# Patient Record
Sex: FEMALE | Race: WHITE
[De-identification: names, ages, dates, MRNs, and addresses within clinical notes are randomized per-mention and may not be internally consistent; named-entity substitution may affect disease eponyms.]

---

## 2018-02-08 ENCOUNTER — HOSPITAL ENCOUNTER (INPATIENT)
Dept: HOSPITAL 45 - C.EDB | Age: 83
LOS: 9 days | Discharge: HOME HEALTH | DRG: 166 | End: 2018-02-17
Attending: FAMILY MEDICINE | Admitting: HOSPITALIST
Payer: COMMERCIAL

## 2018-02-08 VITALS
BODY MASS INDEX: 17.09 KG/M2 | HEIGHT: 64 IN | WEIGHT: 100.09 LBS | WEIGHT: 100.09 LBS | HEIGHT: 64 IN | BODY MASS INDEX: 17.09 KG/M2 | BODY MASS INDEX: 17.09 KG/M2

## 2018-02-08 VITALS
DIASTOLIC BLOOD PRESSURE: 70 MMHG | OXYGEN SATURATION: 91 % | HEART RATE: 89 BPM | TEMPERATURE: 98.24 F | SYSTOLIC BLOOD PRESSURE: 138 MMHG

## 2018-02-08 VITALS
OXYGEN SATURATION: 91 % | TEMPERATURE: 98.24 F | DIASTOLIC BLOOD PRESSURE: 90 MMHG | SYSTOLIC BLOOD PRESSURE: 137 MMHG | HEART RATE: 81 BPM

## 2018-02-08 VITALS — HEART RATE: 78 BPM | OXYGEN SATURATION: 99 %

## 2018-02-08 VITALS — OXYGEN SATURATION: 93 %

## 2018-02-08 VITALS
SYSTOLIC BLOOD PRESSURE: 159 MMHG | TEMPERATURE: 98.6 F | DIASTOLIC BLOOD PRESSURE: 91 MMHG | HEART RATE: 85 BPM | OXYGEN SATURATION: 96 %

## 2018-02-08 VITALS — OXYGEN SATURATION: 94 %

## 2018-02-08 VITALS — OXYGEN SATURATION: 92 % | HEART RATE: 84 BPM

## 2018-02-08 VITALS — OXYGEN SATURATION: 99 % | HEART RATE: 76 BPM

## 2018-02-08 DIAGNOSIS — J43.9: ICD-10-CM

## 2018-02-08 DIAGNOSIS — R91.1: ICD-10-CM

## 2018-02-08 DIAGNOSIS — I10: ICD-10-CM

## 2018-02-08 DIAGNOSIS — Z88.8: ICD-10-CM

## 2018-02-08 DIAGNOSIS — Z88.0: ICD-10-CM

## 2018-02-08 DIAGNOSIS — J96.21: ICD-10-CM

## 2018-02-08 DIAGNOSIS — F17.210: ICD-10-CM

## 2018-02-08 DIAGNOSIS — E46: ICD-10-CM

## 2018-02-08 DIAGNOSIS — R19.7: ICD-10-CM

## 2018-02-08 DIAGNOSIS — D50.9: ICD-10-CM

## 2018-02-08 DIAGNOSIS — Z85.038: ICD-10-CM

## 2018-02-08 DIAGNOSIS — H35.30: ICD-10-CM

## 2018-02-08 DIAGNOSIS — J44.1: Primary | ICD-10-CM

## 2018-02-08 DIAGNOSIS — E03.9: ICD-10-CM

## 2018-02-08 DIAGNOSIS — J93.12: ICD-10-CM

## 2018-02-08 DIAGNOSIS — Z86.19: ICD-10-CM

## 2018-02-08 DIAGNOSIS — E87.6: ICD-10-CM

## 2018-02-08 DIAGNOSIS — Z82.49: ICD-10-CM

## 2018-02-08 DIAGNOSIS — E53.8: ICD-10-CM

## 2018-02-08 DIAGNOSIS — R64: ICD-10-CM

## 2018-02-08 DIAGNOSIS — Z79.899: ICD-10-CM

## 2018-02-08 LAB
ALBUMIN SERPL-MCNC: 3.9 GM/DL (ref 3.4–5)
ALP SERPL-CCNC: 88 U/L (ref 45–117)
ALT SERPL-CCNC: 21 U/L (ref 12–78)
AST SERPL-CCNC: 26 U/L (ref 15–37)
BASOPHILS # BLD: 0.04 K/UL (ref 0–0.2)
BASOPHILS NFR BLD: 0.8 %
BUN SERPL-MCNC: 14 MG/DL (ref 7–18)
CALCIUM SERPL-MCNC: 9.4 MG/DL (ref 8.5–10.1)
CO2 SERPL-SCNC: 30 MMOL/L (ref 21–32)
CREAT SERPL-MCNC: 0.68 MG/DL (ref 0.6–1.2)
EOS ABS #: 0.13 K/UL (ref 0–0.5)
EOSINOPHIL NFR BLD AUTO: 156 K/UL (ref 130–400)
GLUCOSE SERPL-MCNC: 97 MG/DL (ref 70–99)
HCT VFR BLD CALC: 44.6 % (ref 37–47)
HGB BLD-MCNC: 15.2 G/DL (ref 12–16)
IG#: 0.01 K/UL (ref 0–0.02)
IMM GRANULOCYTES NFR BLD AUTO: 26 %
INR PPP: 1 (ref 0.9–1.1)
LYMPHOCYTES # BLD: 1.38 K/UL (ref 1.2–3.4)
MCH RBC QN AUTO: 31.7 PG (ref 25–34)
MCHC RBC AUTO-ENTMCNC: 34.1 G/DL (ref 32–36)
MCV RBC AUTO: 92.9 FL (ref 80–100)
MONO ABS #: 0.56 K/UL (ref 0.11–0.59)
MONOCYTES NFR BLD: 10.6 %
NEUT ABS #: 3.18 K/UL (ref 1.4–6.5)
NEUTROPHILS # BLD AUTO: 2.5 %
NEUTROPHILS NFR BLD AUTO: 59.9 %
PMV BLD AUTO: 10.2 FL (ref 7.4–10.4)
POTASSIUM SERPL-SCNC: 3.2 MMOL/L (ref 3.5–5.1)
PROT SERPL-MCNC: 7.3 GM/DL (ref 6.4–8.2)
PTT PATIENT: 27.3 SECONDS (ref 21–31)
RED CELL DISTRIBUTION WIDTH CV: 13.2 % (ref 11.5–14.5)
RED CELL DISTRIBUTION WIDTH SD: 45.1 FL (ref 36.4–46.3)
SODIUM SERPL-SCNC: 134 MMOL/L (ref 136–145)
WBC # BLD AUTO: 5.3 K/UL (ref 4.8–10.8)

## 2018-02-08 RX ADMIN — METOPROLOL TARTRATE SCH MG: 50 TABLET, FILM COATED ORAL at 20:11

## 2018-02-08 RX ADMIN — IPRATROPIUM BROMIDE AND ALBUTEROL SULFATE SCH ML: .5; 3 SOLUTION RESPIRATORY (INHALATION) at 19:20

## 2018-02-08 RX ADMIN — ALUMINUM ZIRCONIUM TRICHLOROHYDREX GLY SCH EA: 0.2 STICK TOPICAL at 22:11

## 2018-02-08 RX ADMIN — IPRATROPIUM BROMIDE AND ALBUTEROL SULFATE SCH ML: .5; 3 SOLUTION RESPIRATORY (INHALATION) at 17:35

## 2018-02-08 RX ADMIN — CHOLESTYRAMINE SCH GM: 4 POWDER, FOR SUSPENSION ORAL at 21:51

## 2018-02-08 RX ADMIN — METHYLPREDNISOLONE SODIUM SUCCINATE SCH MLS/MIN: 1 INJECTION, POWDER, FOR SOLUTION INTRAMUSCULAR; INTRAVENOUS at 21:51

## 2018-02-08 NOTE — DIAGNOSTIC IMAGING REPORT
CHEST ONE VIEW PORTABLE



CLINICAL HISTORY: Respiratory distress    



COMPARISON STUDY:  1/25/2010



FINDINGS: The chest has an emphysematous configuration. There is a left-sided

pneumothorax with maximal pleural separation of 20 mm. There is no overt

failure. Left basilar airspace opacities are likely atelectatic. There is mild

blunting of the right lateral costophrenic angle.[ 



IMPRESSION: Left-sided pneumothorax with pleural separation of 20 mm.







Electronically signed by:  Sidney Guevara M.D.

2/8/2018 1:32 PM



Dictated Date/Time:  2/8/2018 1:30 PM

## 2018-02-08 NOTE — HISTORY AND PHYSICAL
History & Physical


Date & Time of Service:


Feb 8, 2018 at 16:31


Chief Complaint:


Chest Pain


Primary Care Physician:


Alison Cruz D.O.


History of Present Illness


Source:  patient, family (at bedside), clinic records, hospital records


This is an 82yo F with a PMH of moderate COPD, HTN, tobacco use disorder, 

hypothyroidism and other medical problems listed below who presents with SOB 

beginning this morning. Patient states that 2 mornings ago, she woke up with 

severe pain behind her L shoulder blade that resolved spontaneously throughout 

the day. This morning, patient was getting ready for the day and became 

extremely SOB stating that she couldn't catch her breath and felt like she was 

suffocating. Felt her heart beating very rapidly and felt lightheaded. No chest 

pain. Called EMS and was found to be hypoxic in the 70s en route to the ED. 

Patient is a smoker and has >60 pack years. Does not use home O2 or inhalers. 

Lives alone and ambulates without assistive devices. Has a history of MAXIMINO but 

follows with hematology for IV iron injections. Received her last series 6 

months ago and is due back to clinic for a follow-up. Denies fever, chills, 

headache, visual changes, chest pain, abdominal pain, nausea, vomiting, LE 

swelling. Has chronic diarrhea s/p R hemicolectomy for colon cancer. H/o C diff.





Past Medical/Surgical History


Medical Problems:


(1) COPD (chronic obstructive pulmonary disease)


Status: Chronic  





(2) History of Clostridium difficile infection


Status: Chronic  





(3) Hypertension


Status: Chronic  





(4) Hypothyroidism


Status: Chronic  





(5) MAXIMINO (iron deficiency anemia)


Status: Chronic  





(6) Macular degeneration (senile) of retina, unspecified


Status: Chronic  





(7) Osteoporosis


Status: Chronic  





(8) Tobacco use disorder


Status: Chronic  





(9) Venous insufficiency


Status: Chronic  








Family History





Cancer


Heart disease


Hypertension





Social History


Smoking Status:  Current Every Day Smoker


Housing status:  lives alone


Occupational Status:  retired





Immunizations


History of Influenza Vaccine:  Yes


Influenza Vaccine Date:  Oct 15, 2010


History of Tetanus Vaccine?:  No


Tetanus Immunization Date:  Jul 20, 1983


History of Pneumococcal:  Yes


Pneumococcal Date:  Fuad 15, 2007


History of Hepatitis B Vaccine:  No





Multi-Drug Resistant Organisms


History of MDRO:  No





Allergies


Coded Allergies:  


     Penicillins (Verified  Allergy, Intermediate, RASH; HAS TOLERATED MEFOXIN, 

2/8/18)


     ACE Inhibitors (Verified  Adverse Reaction, Unknown, SEVERE COUGH, 2/8/18)





Home Medications


Scheduled


Amlodipine (Norvasc), 5 MG PO DAILY


Ascorbic Acid (Ascorbic Acid), 500 MG PO DAILY


Cholecalciferol (D3 2000), 1 TAB PO DAILY


Cholestyramine Light (Prevalite), 4 GM PO BID


Cyanocobalamin (Vitamin B-12), 1,000 MCG PO DAILY


Folic Acid (Folic Acid), 1 TAB PO HS


Hydrochlorothiazide (Hydrochlorothiazide), 1 TAB PO DAILY


Levothyroxine Sodium (Levothyroxine Sodium), 1 TAB PO QAM


Metoprolol Tartrate (Lopressor) (Lopressor), 50 MG PO BID


Potassium Bicarbonate (Potassium Bicarbonate), 25 MEQ PO HS





Review of Systems


Ten systems reviewed and negative except as noted in the HPI.





Physical Exam


Vital Signs











  Date Time  Temp Pulse Resp B/P (MAP) Pulse Ox O2 Delivery O2 Flow Rate FiO2


 


2/8/18 16:01  83  151/91 95   


 


2/8/18 15:31  85   96   


 


2/8/18 15:30    150/94    


 


2/8/18 15:01  84  161/87 98   


 


2/8/18 14:31  83  125/93 90   


 


2/8/18 14:01  81   98   


 


2/8/18 14:00    151/91    


 


2/8/18 13:46     99 Non-Rebreather 15.0 


 


2/8/18 13:31  77  142/77 99   


 


2/8/18 13:30  82 20 148/93 99 BiPAP  60


 


2/8/18 13:29    148/93    


 


2/8/18 13:16    154/102    


 


2/8/18 13:09  78 36  99 BiPAP/CPAP  60


 


2/8/18 13:07  76   99   60


 


2/8/18 13:01  76 29  98   


 


2/8/18 12:59  78      


 


2/8/18 12:42    154/102    


 


2/8/18 12:32     98 Non-Rebreather  


 


2/8/18 12:31     74 Room Air  


 


2/8/18 12:31 36.5 81 23 154/102 98 Room Air  


 


2/8/18 12:31     74 Room Air  


 


2/8/18 12:31     74 Room Air  


 


2/8/18 12:31     74 Room Air  








General Appearance:  + mild distress, + pertinent finding (Cachectic, Breathing 

comfortably with non-rebreather. )


Head:  normocephalic, atraumatic


Eyes:  normal inspection, PERRL, sclerae normal


ENT:  normal ENT inspection, hearing grossly normal, pharynx normal


Neck:  supple, thyroid normal, trachea midline


Respiratory/Chest:  chest non-tender, lungs clear, no respiratory distress, no 

accessory muscle use, + decreased breath sounds (2/2 shallow breaths ), + 

pertinent finding (Coarse breath sounds )


Cardiovascular:  regular rate, rhythm, no murmur, normal peripheral pulses


Abdomen/GI:  non tender, soft, no organomegaly


Back:  normal inspection


Extremities/Musculoskelatal:  normal inspection, no calf tenderness, no pedal 

edema


Neurologic/Psych:  no motor/sensory deficits, alert, normal mood/affect, 

oriented x 3


Skin:  normal color, warm/dry





Diagnostics


Laboratory Results





Results Past 24 Hours








Test


  2/8/18


12:22 Range/Units


 


 


White Blood Count 5.30 4.8-10.8  K/uL


 


Red Blood Count 4.80 4.2-5.4  M/uL


 


Hemoglobin 15.2 12.0-16.0  g/dL


 


Hematocrit 44.6 37-47  %


 


Mean Corpuscular Volume 92.9   fL


 


Mean Corpuscular Hemoglobin 31.7 25-34  pg


 


Mean Corpuscular Hemoglobin


Concent 34.1


  32-36  g/dl


 


 


Platelet Count 156 130-400  K/uL


 


Mean Platelet Volume 10.2 7.4-10.4  fL


 


Neutrophils (%) (Auto) 59.9  %


 


Lymphocytes (%) (Auto) 26.0  %


 


Monocytes (%) (Auto) 10.6  %


 


Eosinophils (%) (Auto) 2.5  %


 


Basophils (%) (Auto) 0.8  %


 


Neutrophils # (Auto) 3.18 1.4-6.5  K/uL


 


Lymphocytes # (Auto) 1.38 1.2-3.4  K/uL


 


Monocytes # (Auto) 0.56 0.11-0.59  K/uL


 


Eosinophils # (Auto) 0.13 0-0.5  K/uL


 


Basophils # (Auto) 0.04 0-0.2  K/uL


 


RDW Standard Deviation 45.1 36.4-46.3  fL


 


RDW Coefficient of Variation 13.2 11.5-14.5  %


 


Immature Granulocyte % (Auto) 0.2  %


 


Immature Granulocyte # (Auto) 0.01 0.00-0.02  K/uL


 


Prothrombin Time


  10.0


  9.0-12.0


SECONDS


 


Prothromb Time International


Ratio 1.0


  0.9-1.1  


 


 


Activated Partial


Thromboplast Time 27.3


  21.0-31.0


SECONDS


 


Partial Thromboplastin Ratio 1.1  


 


Sodium Level 134 136-145  mmol/L


 


Potassium Level 3.2 3.5-5.1  mmol/L


 


Chloride Level 97   mmol/L


 


Carbon Dioxide Level 30 21-32  mmol/L


 


Anion Gap 7.0 3-11  mmol/L


 


Blood Urea Nitrogen 14 7-18  mg/dl


 


Creatinine


  0.68


  0.60-1.20


mg/dl


 


Est Creatinine Clear Calc


Drug Dose 47.5


   ml/min


 


 


Estimated GFR (


American) 93.8


   


 


 


Estimated GFR (Non-


American 80.9


   


 


 


BUN/Creatinine Ratio 21.2 10-20  


 


Random Glucose 97 70-99  mg/dl


 


Calcium Level 9.4 8.5-10.1  mg/dl


 


Total Bilirubin 1.3 0.2-1  mg/dl


 


Aspartate Amino Transf


(AST/SGOT) 26


  15-37  U/L


 


 


Alanine Aminotransferase


(ALT/SGPT) 21


  12-78  U/L


 


 


Alkaline Phosphatase 88   U/L


 


Troponin I < 0.015 0-0.045  ng/ml


 


Total Protein 7.3 6.4-8.2  gm/dl


 


Albumin 3.9 3.4-5.0  gm/dl


 


Globulin 3.4 2.5-4.0  gm/dl


 


Albumin/Globulin Ratio 1.2 0.9-2  











Diagnostic Radiology


CXR: IMPRESSION: Left-sided pneumothorax with pleural separation of 20 mm.





EKG


Normal sinus rhythm with sinus arrhythmia





Impression


Assessment and Plan


This is an 82yo F with a PMH of moderate COPD, HTN, tobacco use disorder, 

hypothyroidism and other medical problems listed below who presents with SOB 

beginning this morning and was found to have a small left sided pneumothorax. 





Acute on chronic hypoxic respiratory failure: 


-2/2 pneumothorax, copd exacerbation 


-Found to be hypoxic in 70s by EMS 


-O2 saturation improved on non-rebreather 


-Transitioned to 5L NC 


-Monitor





Left pneumothorax:


-L shoulder pain, SOB 


-CXR with Left-sided pneumothorax with pleural separation of 20 mm


-Consulted thoracic surgery, pulmonary  


-Serial CXRs 


-NC O2 to maintain oxygen saturation >90% 





COPD exacerbation: 


-History of moderate COPD. Does not take home inhalers, use O2 


-Over 60 pack years, still smoking 


-Given rocephin, zithromax, IV steroids in ED 


-Continue antibiotics 


-Solumedrol 


-Duonebs 





Hypothyroidism: 


-Cont levothyroxine 





HTN: 


-Cont home dose amlodipine, hctz, lopressor 





Hypokalemia: 


-Replaced


-Cont home dose oral supplements  





Tobacco use disorder:


-Smoking cessation ordered 





Diarrhea:


-H/o colon cancer s/p resection, c diff


-Cont home dose Prevalite 


-C diff pending 





Anemia (iron deficiency, b12 deficiency):


-Hgb within normal range at 15


-Has a history of MAXIMINO but follows with hematology for IV iron injections


-Cont Vit b12, folate supplementation 





DVT Ppx: SCDs


Code status: FULL per discussion with patient, family 


PCP: Nancy 


Dispo: Admitted telemetry. Discharge planning (patient lives alone and is >80). 

PT/OT evals ordered. 





Patient seen in collaboration with Dr. Randle. Please see addendum. 





ATTENDING ADDENDUM : 


pt seen and examined , care co -ordinated with Tori Limon PA-C 





82 yo F with hx of heavy smoking for long time ( 1 pk a day > 50 yrs ) presents 

with sudden onset of severe chest pain /SOB 


was hypoxic in ER Spo2 74 % in RA 


CXray shows : left Lung pneumothorax 2 cm pleural separation , tubular heart 

with flattened diaphragm , suggestive of severe underlying emphysematous lung 

disease 





P/E: 


GEN : frail appearing elderly female, very pleasant, no apparent distress noted 

,speaking in sentences 


HEENT : sclera non icteric , PERRLA/EOMI 


HT : S1/S2 , no lower ext edema 


LUNGS; diminished, no wheeze or rales noted 


ABDOMEN : soft, non tender 


EXT : no rash or deformity 


NEURO: no focal deficit , AAO x3, 





A/P





ACUTE ON CHRONIC RESPIRATORY FAILURE : 


hx of heaving smoking for years -possible underlying advanced emphysema /COPD ( 

no formal diagnosis ) 


presents with SOB /Hypoxia , found to have spontaneous pneumothorax in left 

upper lungs -possible rupture of bullae 


hypoxia improved after 02 supplement , given IV steroid and 1 hr long neb x in 

ER 


pt is admitted to PCU 


cont respiratory support -on 4 l 02 via nasal canula ( was not on home 02 ) 


cont IV solu medrol , Neb tx 


CT surgery consulted for pneumothorax -appreciate input , serial Cxray ordered-

shows stable 21 mm Left sided pneumothorax 


cont to observe as pt remains clinically stable 


in any event of respiratory distress, progressive hypoxia ,  increase in size 

of the pneumothorax -will need chest tube placement 





Pulmonology eval requested, pt will need formal PFT testing as out pt 


possible 2 step exercise to assess for home02 





TOBACCO ABUSE DISORDER: 


smokes a pk /cig a day for last 50-60yrs 


smoking cessation counselling provided 


pt is updated -her spont pneumothorax /SOB are consequences of long term 

heaving smoking 


willing to quit 





LOW BMI/PROTEIN CALORIE MALNUTRITION : 


possible due to advanced pulm disease 


liberalized diet to regular 


dietary consulted for supplements








LOW NA/LOW K


possible due to HCTZ /poor PO intake 


NSS IV fluid ordered 


K replaced, 


repeat BMP in AM 





FULL CODE 





please refer to documentation of Tori Limon PA-C for further discussion of 

other issues 





YANIV RANDLE MD 





VITALS :








Last 8 Hrs








  Date Time  Temp Pulse Resp B/P (MAP) Pulse Ox O2 Delivery O2 Flow Rate FiO2


 


2/8/18 20:04 36.8 89 20 138/70 (92) 91 Nasal Cannula 4.0 


 


2/8/18 19:23  84 22  92 Nasal Cannula 4.0 


 


2/8/18 18:19     94 Nasal Cannula 4.0 





      Humidified Oxygen  


 


2/8/18 17:21     93 Nasal Cannula 5.0 


 


2/8/18 16:50 37.0 85 24 159/91 96 Non-Rebreather 15.0 


 


2/8/18 16:01  83  151/91 95   


 


2/8/18 15:31  85   96   


 


2/8/18 15:30    150/94    


 


2/8/18 15:01  84  161/87 98   


 


2/8/18 14:31  83  125/93 90   


 


2/8/18 14:01  81   98   


 


2/8/18 14:00    151/91    











Level of Care


Telemetry





Resuscitation Status


FULL RESUSCITATION





VTE Prophylaxis


VTE Risk Assessment Done? Y/N:  Yes


Risk Level:  Moderate


Given or contraindicated:  SCD's





Additional Copies To


Alison Cruz D.O.

## 2018-02-08 NOTE — DIAGNOSTIC IMAGING REPORT
CHEST ONE VIEW PORTABLE



CLINICAL HISTORY: 83 years-old Female presenting with F/u PTX. 



TECHNIQUE: Portable upright AP view of the chest was obtained.



COMPARISON: 2/8/2018 at 3:43 PM.



FINDINGS:

Atherosclerosis of aortic arch. Cardiac silhouette mildly enlarged.

Hyperinflation of the lungs with relative radiolucency of the upper lobes.

Significant interval increase in left lower lobe collapse, which now appears

complete. Persistent small moderate left pleural effusion with a pleural

separation of 21 mm, unchanged. Osteopenia. Upper abdomen normal.



IMPRESSION:

1.  Interval development of complete left lower lobe collapse.



2.  Unchanged small to moderate left pneumothorax.



3.  Emphysema.



The report will be called/faxed according to standard departmental protocol.







Electronically signed by:  Sai Fraga M.D.

2/8/2018 9:09 PM



Dictated Date/Time:  2/8/2018 9:07 PM

## 2018-02-08 NOTE — EMERGENCY ROOM VISIT NOTE
History


Report prepared by Jeny:  Armida Cameron


Under the Supervision of:  Dr. Manohar Linn M.D.


First contact with patient:  12:35


Chief Complaint:  CHEST PAIN


Stated Complaint:  CHEST PAIN





History of Present Illness


The patient is an 83 year old white female with a past medical history of 

hypertension who presents to the ED with a cc of persistent SOB beginning 1 

hour ago. She presents to the ED by EMS. She received aspirin and nitro in 

route. Positive nausea, cough, resolved palpitations, resolved chest pain. 

Negative vomiting, leg swelling, weight gain. She does smoke. She is not on any 

blood thinners. She received a flu shot this season.





   Source of History:  patient, nursing staff


   Onset:  1 hour ago


   Position:  other (global)


   Quality:  other (SOB)


   Timing:  other (persistent)


   Associated Symptoms:  + cough, + chest pain (resolved), + nausea, No vomiting





Review of Systems


See HPI for pertinent positives and negatives.  A total of ten systems were 

reviewed and were otherwise negative.





Past Medical & Surgical


Medical Problems:


(1) COPD (chronic obstructive pulmonary disease)


(2) History of Clostridium difficile infection


(3) Hypertension


(4) Hypothyroidism


(5) MAXIMINO (iron deficiency anemia)


(6) Macular degeneration (senile) of retina, unspecified


(7) Osteoporosis


(8) Tobacco use disorder


(9) Venous insufficiency


Surgical Problems:


(1) H/O partial resection of colon








Family History





Cancer


Heart disease


Hypertension





Social History


Smoking Status:  Current Every Day Smoker


Occupation Status:  retired





Current/Historical Medications


Scheduled


Amlodipine (Norvasc), 5 MG PO DAILY


Ascorbic Acid (Ascorbic Acid), 500 MG PO DAILY


Cholecalciferol (D3 2000), 1 TAB PO DAILY


Cholestyramine Light (Prevalite), 4 GM PO BID


Cyanocobalamin (Vitamin B-12), 1,000 MCG PO DAILY


Folic Acid (Folic Acid), 1 TAB PO HS


Hydrochlorothiazide (Hydrochlorothiazide), 1 TAB PO DAILY


Levothyroxine Sodium (Levothyroxine Sodium), 1 TAB PO QAM


Metoprolol Tartrate (Lopressor) (Lopressor), 50 MG PO BID


Potassium Bicarbonate (Potassium Bicarbonate), 25 MEQ PO HS





Allergies


Coded Allergies:  


     Penicillins (Verified  Allergy, Intermediate, RASH; HAS TOLERATED MEFOXIN, 

2/8/18)


     ACE Inhibitors (Verified  Adverse Reaction, Unknown, SEVERE COUGH, 2/8/18)





Physical Exam


Vital Signs











  Date Time  Temp Pulse Resp B/P (MAP) Pulse Ox O2 Delivery O2 Flow Rate FiO2


 


2/8/18 15:31  85   96   


 


2/8/18 15:30    150/94    


 


2/8/18 15:01  84  161/87 98   


 


2/8/18 14:31  83  125/93 90   


 


2/8/18 14:01  81   98   


 


2/8/18 14:00    151/91    


 


2/8/18 13:46     99 Non-Rebreather 15.0 


 


2/8/18 13:31  77  142/77 99   


 


2/8/18 13:30  82 20 148/93 99 BiPAP  60


 


2/8/18 13:29    148/93    


 


2/8/18 13:16    154/102    


 


2/8/18 13:09  78 36  99 BiPAP/CPAP  60


 


2/8/18 13:07  76   99   60


 


2/8/18 13:01  76 29  98   


 


2/8/18 12:59  78      


 


2/8/18 12:42    154/102    


 


2/8/18 12:32     98 Non-Rebreather  


 


2/8/18 12:31     74 Room Air  


 


2/8/18 12:31 36.5 81 23 154/102 98 Room Air  


 


2/8/18 12:31     74 Room Air  


 


2/8/18 12:31     74 Room Air  


 


2/8/18 12:31     74 Room Air  











Physical Exam


GENERAL: Awake, alert, emaciated, NAD


HENT: Normocephalic, atraumatic.


EYES: Normal conjunctiva. Sclera non-icteric.


NECK: Supple. No nuchal rigidity. FROM.


RESPIRATORY: Tachypnea, shallow breaths, bibasilar crackles. 


CARDIAC: RRR, no MRG


ABDOMEN: Soft, NTND, BS+


MSK: No chest wall TTP, no LE edema


NEURO: GCS 15, CN 2-12 intact, moves all 4s on command


SKIN: No rash or jaundice noted.





Medical Decision & Procedures


ER Provider


Diagnostic Interpretation:


Xray results as stated below per my and radiologist interpretation: 





CHEST ONE VIEW PORTABLE





CLINICAL HISTORY: Respiratory distress    





COMPARISON STUDY:  1/25/2010





FINDINGS: The chest has an emphysematous configuration. There is a left-sided


pneumothorax with maximal pleural separation of 20 mm. There is no overt


failure. Left basilar airspace opacities are likely atelectatic. There is mild


blunting of the right lateral costophrenic angle.[ 





IMPRESSION: Left-sided pneumothorax with pleural separation of 20 mm.





Electronically signed by:  Sidney Guevara M.D.


2/8/2018 1:32 PM





Dictated Date/Time:  2/8/2018 1:30 PM





Laboratory Results











Test


  2/8/18


12:22


 


Immature Granulocyte % (Auto) 0.2 % 


 


White Blood Count


  5.30 K/uL


(4.8-10.8)


 


Red Blood Count


  4.80 M/uL


(4.2-5.4)


 


Hemoglobin


  15.2 g/dL


(12.0-16.0)


 


Hematocrit 44.6 % (37-47) 


 


Mean Corpuscular Volume


  92.9 fL


()


 


Mean Corpuscular Hemoglobin


  31.7 pg


(25-34)


 


Mean Corpuscular Hemoglobin


Concent 34.1 g/dl


(32-36)


 


Platelet Count


  156 K/uL


(130-400)


 


Mean Platelet Volume


  10.2 fL


(7.4-10.4)


 


Neutrophils (%) (Auto) 59.9 % 


 


Lymphocytes (%) (Auto) 26.0 % 


 


Monocytes (%) (Auto) 10.6 % 


 


Eosinophils (%) (Auto) 2.5 % 


 


Basophils (%) (Auto) 0.8 % 


 


Neutrophils # (Auto)


  3.18 K/uL


(1.4-6.5)


 


Lymphocytes # (Auto)


  1.38 K/uL


(1.2-3.4)


 


Monocytes # (Auto)


  0.56 K/uL


(0.11-0.59)


 


Eosinophils # (Auto)


  0.13 K/uL


(0-0.5)


 


Basophils # (Auto)


  0.04 K/uL


(0-0.2)


 


Immature Granulocyte # (Auto)


  0.01 K/uL


(0.00-0.02)


 


Prothrombin Time


  10.0 SECONDS


(9.0-12.0)


 


Prothromb Time International


Ratio 1.0 (0.9-1.1) 


 


 


Activated Partial


Thromboplast Time 27.3 SECONDS


(21.0-31.0)


 


Partial Thromboplastin Ratio 1.1 


 


Total Bilirubin


  1.3 mg/dl


(0.2-1)


 


Aspartate Amino Transf


(AST/SGOT) 26 U/L (15-37) 


 


 


Alanine Aminotransferase


(ALT/SGPT) 21 U/L (12-78) 


 


 


Alkaline Phosphatase


  88 U/L


()


 


Total Protein


  7.3 gm/dl


(6.4-8.2)


 


Albumin


  3.9 gm/dl


(3.4-5.0)


 


Globulin


  3.4 gm/dl


(2.5-4.0)


 


Albumin/Globulin Ratio 1.2 (0.9-2) 





Laboratory results reviewed by me





Medications Administered











 Medications


  (Trade)  Dose


 Ordered  Sig/Adali


 Route  Start Time


 Stop Time Status Last Admin


Dose Admin


 


 Albuterol/


 Ipratropium


  (Duoneb)  12 ml  ONE  ONCE


 INH  2/8/18 13:00


 2/8/18 13:01 DC 2/8/18 13:07


12 ML


 


 Methylprednisolone


 Sodium Succinate


  (Solu-Medrol IV)  125 mg  NOW  STAT


 IV  2/8/18 12:50


 2/8/18 12:53 DC 2/8/18 14:25


125 MG


 


 Ceftriaxone Sodium


  (Rocephin Inj)  1 gm  NOW  STAT


 IV  2/8/18 12:50


 2/8/18 12:53 DC 2/8/18 14:25


1 GM


 


 Azithromycin


  (Zithromax Tab)  500 mg  NOW  ONCE


 PO  2/8/18 13:00


 2/8/18 13:01 DC 2/8/18 14:25


500 MG











ECG


Indication:  SOB/dyspnea


Rate (beats per minute):  84


Rhythm:  sinus with SA


Findings:  other (right axis deviation, normal intervals)


Change:


Patient's electrocardiogram interpreted by me.





ED Course


1243: The patient was evaluated in room B9. A complete history and physical 

exam was performed.





1344: Dr. Thakur was paged. 





1444: I discussed the patient's case with Tori Limon PA-C [facility] 

hospitalist. The patient will be evaluated for further treatment and 

disposition.





Medical Decision


The patient is an 83 year old white female with a past medical history of 

hypertension who presents to the ED with a cc of persistent SOB beginning 1 

hour ago.





Differential diagnosis:


Etiologies such as infections, reactive airway disease, pneumonia, pneumothorax

, COPD, CHF, cardiac ischemia, pulmonary embolism, musculoskeletal, 

gastrointestinal, as well as others were entertained.





Patient seen and evaluated at bedside. Patient w/ hypoxia in triage to high 

70s. Placed on NRB mask. Patient mild tachypnea. Patient has mild chronic SOB 

but worse in last hour. Patient was given ASA and nitro en route. Denies 

improvement in symptoms. EKG no acute ischemia. BiPAP ordered and CXR, labs. 

Patient has > 60 pack year smoking hx so trx'ed for possible COPD exacerbation 

and covered empirically w/ abx given hypoxia and SOB. Patient CXR concerning 

for L sided PNX. Not very large. No shift. Likely 2/2 to COPD/emphysema bleb 

rupture given smoking hx. Patient immediately removed off BiPAP and placed back 

on NRB mask. Patient other blood work fairly unremarkable. Patient discussed w/ 

hospitalist to monitor for PNX. Discussed patient w/ thoracic surgery. Do not 

believe patient requires chest tube placement at this time. No midline shift on 

CXR, not tachy, tolerating NRB mask and not hypoxic, not hypotensive. Patient 

admitted to medicine. 








Medication Reconcilliation


Current Medication List:  was personally reviewed by me





Blood Pressure Screening


Patient's blood pressure:  Elevated blood pressure


Referred to hospitalist.





Consults


Time Called:  1440


Consulting Physician:  SE Alberto hospitalist


Returned Call:  1444


Discussed the patient's case. The patient will be evaluated for further 

treatment and disposition.





Impression





 Primary Impression:  


 Pneumothorax


 Additional Impressions:  


 SOB (shortness of breath)


 Respiratory failure with hypoxia


 COPD (chronic obstructive pulmonary disease)


 Hypokalemia





Critical Care


I have personally spent greater than 55 minutes of critical care time in the 

direct management of this patient.  This includes bedside care, interpretation 

of diagnostic studies, and testing, discussion with consultants, patient, and 

family members, and other required patient management activities.  This 55 

minutes is in excess of all separately billable procedures.





Scribe Attestation


The scribe's documentation has been prepared under my direction and personally 

reviewed by me in its entirety. I confirm that the note above accurately 

reflects all work, treatment, procedures, and medical decision making performed 

by me.





Departure Information


Dispostion


Being Evaluated By Hospitalist





Referrals


Jeannie Escoto M.D. (PCP)





Patient Instructions


My [facility]





Problem Qualifiers








 Primary Impression:  


 Pneumothorax


 Pneumothorax type:  spontaneous, primary  Qualified Codes:  J93.11 - Primary 

spontaneous pneumothorax


 Additional Impressions:  


 Respiratory failure with hypoxia


 Chronicity:  acute  Qualified Codes:  J96.01 - Acute respiratory failure with 

hypoxia


 COPD (chronic obstructive pulmonary disease)


 COPD type:  COPD with acute exacerbation  Qualified Codes:  J44.1 - Chronic 

obstructive pulmonary disease with (acute) exacerbation

## 2018-02-08 NOTE — DIAGNOSTIC IMAGING REPORT
CHEST ONE VIEW PORTABLE



CLINICAL HISTORY: 83 years-old Female presenting with serial XRs pneumothorax . 



TECHNIQUE: Portable upright AP view of the chest was obtained.



COMPARISON: 2/8/2018.



FINDINGS:

Atherosclerosis of aortic arch. Cardiac silhouette mildly enlarged, unchanged.

Lungs hyperinflated. Coarsened lung markings with relative radiolucency at the

apices. Persistent small to moderate left pneumothorax with a pleural separation

of 22 mm, not significant changed from prior (previously 20 mm). No pleural

effusion. No new focal infiltrate. No mediastinal shift. Osteopenia. Upper

abdomen normal.



IMPRESSION:

1.  Unchanged small to moderate left pneumothorax.



2.  Emphysema.







Electronically signed by:  Sai Fraga M.D.

2/8/2018 3:59 PM



Dictated Date/Time:  2/8/2018 3:57 PM

## 2018-02-08 NOTE — PROGRESS NOTE
Progress Note


Date of Service


Feb 8, 2018.





Progress Note


ATTENDING NOTE : 





cxray at 2100 reviewed :


Unchanged left upper lung Pneumothorax with pleural separation 21 mm 


pt remains hemodynamically stable 


denies of any symptom of chest pain or SOB 


Spo2 94 % on 4 L 02 via NC 





repeat Cxray ordered in AM

## 2018-02-09 VITALS — OXYGEN SATURATION: 92 %

## 2018-02-09 VITALS
OXYGEN SATURATION: 91 % | TEMPERATURE: 97.88 F | DIASTOLIC BLOOD PRESSURE: 84 MMHG | SYSTOLIC BLOOD PRESSURE: 129 MMHG | HEART RATE: 104 BPM

## 2018-02-09 VITALS
OXYGEN SATURATION: 92 % | DIASTOLIC BLOOD PRESSURE: 70 MMHG | HEART RATE: 90 BPM | TEMPERATURE: 98.6 F | SYSTOLIC BLOOD PRESSURE: 115 MMHG

## 2018-02-09 VITALS
SYSTOLIC BLOOD PRESSURE: 147 MMHG | DIASTOLIC BLOOD PRESSURE: 78 MMHG | HEART RATE: 78 BPM | TEMPERATURE: 98.06 F | OXYGEN SATURATION: 92 %

## 2018-02-09 VITALS
HEART RATE: 83 BPM | TEMPERATURE: 98.6 F | OXYGEN SATURATION: 92 % | SYSTOLIC BLOOD PRESSURE: 115 MMHG | DIASTOLIC BLOOD PRESSURE: 70 MMHG

## 2018-02-09 VITALS
HEART RATE: 74 BPM | TEMPERATURE: 98.24 F | DIASTOLIC BLOOD PRESSURE: 83 MMHG | SYSTOLIC BLOOD PRESSURE: 145 MMHG | OXYGEN SATURATION: 96 %

## 2018-02-09 VITALS
SYSTOLIC BLOOD PRESSURE: 116 MMHG | HEART RATE: 75 BPM | TEMPERATURE: 97.52 F | OXYGEN SATURATION: 96 % | DIASTOLIC BLOOD PRESSURE: 62 MMHG

## 2018-02-09 VITALS — SYSTOLIC BLOOD PRESSURE: 120 MMHG | DIASTOLIC BLOOD PRESSURE: 74 MMHG | OXYGEN SATURATION: 93 % | HEART RATE: 85 BPM

## 2018-02-09 VITALS
HEART RATE: 86 BPM | OXYGEN SATURATION: 96 % | DIASTOLIC BLOOD PRESSURE: 66 MMHG | SYSTOLIC BLOOD PRESSURE: 122 MMHG | TEMPERATURE: 98.06 F

## 2018-02-09 VITALS — OXYGEN SATURATION: 93 % | HEART RATE: 72 BPM

## 2018-02-09 VITALS — OXYGEN SATURATION: 92 % | HEART RATE: 90 BPM

## 2018-02-09 VITALS — HEART RATE: 76 BPM | OXYGEN SATURATION: 92 %

## 2018-02-09 VITALS — OXYGEN SATURATION: 95 %

## 2018-02-09 VITALS — OXYGEN SATURATION: 95 % | HEART RATE: 77 BPM

## 2018-02-09 VITALS — OXYGEN SATURATION: 99 %

## 2018-02-09 LAB
BUN SERPL-MCNC: 23 MG/DL (ref 7–18)
CALCIUM SERPL-MCNC: 8.4 MG/DL (ref 8.5–10.1)
CO2 SERPL-SCNC: 28 MMOL/L (ref 21–32)
CREAT SERPL-MCNC: 0.75 MG/DL (ref 0.6–1.2)
EOSINOPHIL NFR BLD AUTO: 139 K/UL (ref 130–400)
GLUCOSE SERPL-MCNC: 122 MG/DL (ref 70–99)
HCT VFR BLD CALC: 37 % (ref 37–47)
HGB BLD-MCNC: 12.4 G/DL (ref 12–16)
MCH RBC QN AUTO: 30.8 PG (ref 25–34)
MCHC RBC AUTO-ENTMCNC: 33.5 G/DL (ref 32–36)
MCV RBC AUTO: 91.8 FL (ref 80–100)
PMV BLD AUTO: 9.7 FL (ref 7.4–10.4)
POTASSIUM SERPL-SCNC: 4.2 MMOL/L (ref 3.5–5.1)
RED CELL DISTRIBUTION WIDTH CV: 13 % (ref 11.5–14.5)
RED CELL DISTRIBUTION WIDTH SD: 43.9 FL (ref 36.4–46.3)
SODIUM SERPL-SCNC: 135 MMOL/L (ref 136–145)
WBC # BLD AUTO: 4.69 K/UL (ref 4.8–10.8)

## 2018-02-09 RX ADMIN — IPRATROPIUM BROMIDE AND ALBUTEROL SULFATE SCH ML: .5; 3 SOLUTION RESPIRATORY (INHALATION) at 07:00

## 2018-02-09 RX ADMIN — METOPROLOL TARTRATE SCH MG: 50 TABLET, FILM COATED ORAL at 20:46

## 2018-02-09 RX ADMIN — METHYLPREDNISOLONE SODIUM SUCCINATE SCH MLS/MIN: 1 INJECTION, POWDER, FOR SOLUTION INTRAMUSCULAR; INTRAVENOUS at 21:46

## 2018-02-09 RX ADMIN — Medication SCH MCG: at 07:58

## 2018-02-09 RX ADMIN — IPRATROPIUM BROMIDE AND ALBUTEROL SULFATE SCH ML: .5; 3 SOLUTION RESPIRATORY (INHALATION) at 14:37

## 2018-02-09 RX ADMIN — HYDROCHLOROTHIAZIDE SCH MG: 25 TABLET ORAL at 07:57

## 2018-02-09 RX ADMIN — METHYLPREDNISOLONE SODIUM SUCCINATE SCH MLS/MIN: 1 INJECTION, POWDER, FOR SOLUTION INTRAMUSCULAR; INTRAVENOUS at 05:30

## 2018-02-09 RX ADMIN — ALUMINUM ZIRCONIUM TRICHLOROHYDREX GLY SCH EA: 0.2 STICK TOPICAL at 16:00

## 2018-02-09 RX ADMIN — CHOLESTYRAMINE SCH GM: 4 POWDER, FOR SUSPENSION ORAL at 21:46

## 2018-02-09 RX ADMIN — ALUMINUM ZIRCONIUM TRICHLOROHYDREX GLY SCH EA: 0.2 STICK TOPICAL at 07:39

## 2018-02-09 RX ADMIN — METOPROLOL TARTRATE SCH MG: 50 TABLET, FILM COATED ORAL at 07:57

## 2018-02-09 RX ADMIN — POTASSIUM CHLORIDE SCH MEQ: 1500 TABLET, EXTENDED RELEASE ORAL at 07:56

## 2018-02-09 RX ADMIN — Medication SCH INTER.UNIT: at 07:57

## 2018-02-09 RX ADMIN — IPRATROPIUM BROMIDE AND ALBUTEROL SULFATE SCH ML: .5; 3 SOLUTION RESPIRATORY (INHALATION) at 19:05

## 2018-02-09 RX ADMIN — LEVOTHYROXINE SODIUM SCH MCG: 88 TABLET ORAL at 05:31

## 2018-02-09 RX ADMIN — CEFTRIAXONE SODIUM SCH MLS/HR: 1 INJECTION, POWDER, FOR SOLUTION INTRAVENOUS at 14:46

## 2018-02-09 RX ADMIN — AMLODIPINE BESYLATE SCH MG: 5 TABLET ORAL at 07:57

## 2018-02-09 RX ADMIN — METHYLPREDNISOLONE SODIUM SUCCINATE SCH MLS/MIN: 1 INJECTION, POWDER, FOR SOLUTION INTRAMUSCULAR; INTRAVENOUS at 14:47

## 2018-02-09 RX ADMIN — IPRATROPIUM BROMIDE AND ALBUTEROL SULFATE SCH ML: .5; 3 SOLUTION RESPIRATORY (INHALATION) at 11:17

## 2018-02-09 RX ADMIN — OXYCODONE HYDROCHLORIDE AND ACETAMINOPHEN SCH MG: 500 TABLET ORAL at 07:57

## 2018-02-09 RX ADMIN — AZITHROMYCIN SCH MG: 250 TABLET, FILM COATED ORAL at 07:57

## 2018-02-09 RX ADMIN — CHOLESTYRAMINE SCH GM: 4 POWDER, FOR SUSPENSION ORAL at 10:00

## 2018-02-09 NOTE — SURGERY PROGRESS NOTE
DATE: 02/09/2018

 

Ms. Vilchis was seen today.  She "feels better."  Her x-ray showed unchanged

small pneumothorax.  She is having some pain in her left shoulder which was

the presenting symptoms for this.  She denies dyspnea.  She is not using

accessory muscles.  Her saturations on 4 liters had been anywhere from

92-99%.  I was pleased with her x-ray and really saw no change from her prior 
CXR.

At this point, I would hold off putting a chest tube inti her left pleural 
cavity. 

I would recommend that we use a nicotine patch as the patient has been 

smoking a pack of cigarette regularly until the time of her admission.  
Otherwise, 

I would hold off inserting a chest tube today.  We will continue to follow her 
with serial x-rays.

 

 

 

 

KIYA

## 2018-02-09 NOTE — PULMONARY CONSULTATION
History


General


Date of Service:


Feb 9, 2018.


Chief Complaint:  Pneumothorax, shortness of breath


Stated Complaint:


Copd, Pneumothorax, Respiratory Failure W/ Hypoxia





HPI


The patient is a 83 year old female who presents to [facility] with complaints of Copd, Pneumothorax, Respiratory Failure W/ Hypoxia. 

The patient's primary care provider is Alison Cruz D.O..





Patient lives at home and presented with shortness of breath and pneumothorax 

at the left apex equal to 19 mm.  Repeat chest x-rays appear stable with most 

recent chest x-ray showing pneumothorax of 20 mm of separation.  Patient 

normally on room air at home.  Saturating well at 2 L/min via nasal cannula 

with SaO2 in the mid 90s.  No further respiratory distress.  Patient reports 

being a 1 pack per day smoker since age 20.  She lives alone.  Patient had no 

preemptive illness.  No recent travel.  No contact with sick contacts.





In addition to pulmonary being contacted for COPD exacerbation, Dr. Thakur 

also consulted to follow pneumothorax.  Patient is a retired nurse and requests 

that she has to be avoided if at all possible.  Previously described pain 

around left shoulder blade has resolved.  No further complaints.


Historian:  patient





Review of Systems


A total of 12 systems was reviewed and is negative other than as listed above 

in the HPI





All Other Symptoms


All Other Systems:  Reviewed and Negative





Past Medical History


Past Medical History:


Medical Problems:


(1) COPD (chronic obstructive pulmonary disease)


(2) History of Clostridium difficile infection in 2010


(3) Hypertension


(4) Hypothyroidism


(5) MAXIMINO (iron deficiency anemia)


(6) Macular degeneration (senile) of retina, unspecified


(7) Osteoporosis


(8) Tobacco use disorder-daily use of cigarettes


(9) Venous insufficiency


(10) history of MRSA in 2010


(11) history of colon cancer with reversal and anastomosis of colostomy


Past Surgical History:


Surgical Problems:


(1) H/O partial resection of colon


(2) reversal of colostomy





Family History





Cancer


Heart disease


Hypertension





Social History


Hx Tobacco Use In Past Year?:  Yes


Smoking Status:  Current Every Day Smoker


Alcohol:  socially


Drug Use:  none


Marital status:  


Housing status:  lives alone


Occupational Status:  retired





Immunizations


History of Influenza Vaccine:  Yes


Influenza Vaccine Date:  Oct 15, 2010


History of Tetanus Vaccine?:  No


Tetanus Immunization Date:  Jul 20, 1983


History of Pneumococcal:  Yes


Pneumococcal Date:  Fuad 15, 2007


History of Hepatitis B Vaccine:  No





History of MDRO


History of MDRO:  No





Allergies


Coded Allergies:  


     Penicillins (Verified  Allergy, Intermediate, RASH; HAS TOLERATED MEFOXIN, 

2/8/18)


     ACE Inhibitors (Verified  Adverse Reaction, Unknown, SEVERE COUGH, 2/8/18)





Current Medications





Reported Home Medications








 Medications  Dose


 Route/Sig


 Max Daily Dose Days Date Category


 


 Vitamin B-12


  (Cyanocobalamin)


 1,000 Mcg Tab  1,000 Mcg


 PO DAILY


    2/8/18 Reported


 


 Ascorbic Acid 1


 Pow Pow  500 Mg


 PO DAILY


    2/8/18 Reported


 


 Prevalite


  (Cholestyramine


 Light) 4 Gm/Dose


 Pow  4 Gm


 PO BID


    2/8/18 Reported


 


 Norvasc


  (Amlodipine


 Besylate) 5 Mg Tab  5 Mg


 PO DAILY


    2/8/18 Reported


 


 Lopressor


  (Metoprolol


 Tartrate) 50 Mg


 Tab  50 Mg


 PO BID


    2/8/18 Reported


 


 Potassium


 Bicarbonate 1 Pow


 Pow  25 Meq


 PO HS


    2/8/18 Reported


 


 Hydrochlorothiazide


 12.5 Mg Tab  1 Tab


 PO DAILY


    2/8/18 Reported


 


 Levothyroxine


 Sodium 88 Mcg Tab  1 Tab


 PO QAM


    2/8/18 Reported











Physical


Physical Exam


Vital Signs:











  Date Time  Temp Pulse Resp B/P (MAP) Pulse Ox O2 Delivery O2 Flow Rate FiO2


 


2/9/18 15:45 37.0 83 18 115/70 (85) 92 Nasal Cannula 3.0 


 


2/9/18 15:17  85   93   


 


2/9/18 14:37  76 20  92 Nasal Cannula 3.0 


 


2/9/18 12:02      Nasal Cannula 3.0 


 


2/9/18 11:41 36.7 78 23 147/78 (101) 92 Nasal Cannula 2.0 


 


2/9/18 11:17  77 20  95 Nasal Cannula 4.0 


 


2/9/18 08:05     92 Nasal Cannula 4.0 


 


2/9/18 07:01 36.8 74 20 145/83 (103) 96 Nasal Cannula 4.0 


 


2/9/18 07:00  72 20  93 Nasal Cannula 4.0 


 


2/9/18 04:10     99 Nasal Cannula 4.0 


 


2/9/18 03:13 36.4 75 22 116/62 (80) 96 Nasal Cannula 4.0 


 


2/9/18 00:03     95 Nasal Cannula 4.0 


 


2/8/18 23:15 36.8 81 20 137/90 (106) 91 Nasal Cannula 4.0 


 


2/8/18 20:11     93 Nasal Cannula 4.0 


 


2/8/18 20:04 36.8 89 20 138/70 (92) 91 Nasal Cannula 4.0 


 


2/8/18 19:23  84 22  92 Nasal Cannula 4.0 








Weight in Kilograms:  47.6


GENERAL : No acute distress.  Good sense of humor


EYES:  No icterus, gaze conjugate.  PERRL


NOSE: No evidence of epistaxis


MOUTH:  No lesions or candidiasis


NECK:  Supple


LUNGS:  No rales or rhonchi.  Bronchospasm and lower bilateral lung fields


HEART:  Regular, rate controlled


ABDOMEN:  Soft, NT, ND, BS Present


EXTREMITIES:  No LE edema, pedal pulses intact


NEURO:  A&OX3





Diagnostics


Labs





Results Past 24 Hours








Test


  2/8/18


19:15 2/9/18


00:15 2/9/18


05:19 Range/Units


 


 


Urine Color YELLOW    


 


Urine Appearance CLEAR   CLEAR  


 


Urine pH 6.5   4.5-7.5  


 


Urine Specific Gravity 1.016   1.000-1.030  


 


Urine Protein 1+   NEG  


 


Urine Glucose (UA) NEG   NEG  


 


Urine Ketones NEG   NEG  


 


Urine Occult Blood TRACE   NEG  


 


Urine Nitrite NEG   NEG  


 


Urine Bilirubin NEG   NEG  


 


Urine Urobilinogen NEG   NEG  


 


Urine Leukocyte Esterase NEG   NEG  


 


Urine WBC (Auto) 1-5   0-5  /hpf


 


Urine RBC (Auto) 5-10   0-4  /hpf


 


Urine Hyaline Casts (Auto) 1-5   0-5  /lpf


 


Urine Epithelial Cells (Auto) 20-30   0-5  /lpf


 


Urine Bacteria (Auto) NEG   NEG  


 


Troponin I  < 0.015  0-0.045  ng/ml


 


White Blood Count   4.69 4.8-10.8  K/uL


 


Red Blood Count   4.03 4.2-5.4  M/uL


 


Hemoglobin   12.4 12.0-16.0  g/dL


 


Hematocrit   37.0 37-47  %


 


Mean Corpuscular Volume   91.8   fL


 


Mean Corpuscular Hemoglobin   30.8 25-34  pg


 


Mean Corpuscular Hemoglobin


Concent 


  


  33.5


  32-36  g/dl


 


 


RDW Standard Deviation   43.9 36.4-46.3  fL


 


RDW Coefficient of Variation   13.0 11.5-14.5  %


 


Platelet Count   139 130-400  K/uL


 


Mean Platelet Volume   9.7 7.4-10.4  fL


 


Sodium Level   135 136-145  mmol/L


 


Potassium Level   4.2 3.5-5.1  mmol/L


 


Chloride Level   101   mmol/L


 


Carbon Dioxide Level   28 21-32  mmol/L


 


Anion Gap   6.0 3-11  mmol/L


 


Blood Urea Nitrogen   23 7-18  mg/dl


 


Creatinine


  


  


  0.75


  0.60-1.20


mg/dl


 


Est Creatinine Clear Calc


Drug Dose 


  


  42.7


   ml/min


 


 


Estimated GFR (


American) 


  


  85.4


   


 


 


Estimated GFR (Non-


American 


  


  73.7


   


 


 


BUN/Creatinine Ratio   30.7 10-20  


 


Random Glucose   122 70-99  mg/dl


 


Calcium Level   8.4 8.5-10.1  mg/dl











Diagnostic Radiology


CHEST ONE VIEW PORTABLE





CLINICAL HISTORY: pneumothorax    





COMPARISON STUDY:  February 8, 2018





FINDINGS: There is radiographic evidence of emphysema. There is a stable left


apical pneumothorax the pleural separation of 21 mm. There is left lower lobe


collapse. There is stable blunting of the right lateral costophrenic angle.[ 





IMPRESSION: 


1. Persistent left lower lobe collapse


2. Persistent 21 mm left apical pneumothorax











Electronically signed by:  Sidney Guevara M.D.


2/9/2018 7:11 AM





Impression


Assessment and Plan


COPD EXACERBATION


* Patient does not require supplemental O2 at home


* Titrate supplemental O2 as tolerated to maintain SaO2 between 88 and 92%


* Continue with IV steroids


* Patient started on azithromycin and ceftriaxone


* Patient feels improved since admission





LEFT PNEUMOTHORAX


* Patient is currently being followed by Dr. Thakur who was consulted this 

admission


* 21 mm persistent pneumothorax on the left.


* Patient refusing chest tube at this time but remains clinically stable


* Further management per Dr. Thakur





DVT PROPHYLAXIS


* SCD/TEDs


* Ambulate as tolerated





Thank you for including us in the care of this patient.  Please refer to Dr. Marrero addendum for further recommendations.  We will continue to follow 

along with this patient

## 2018-02-09 NOTE — PROGRESS NOTE
Progress Note


Date of Service


Feb 9, 2018.





Progress Note


Subjective: patient speaking comfortably on nasal cannula





Physical Exam


General Appearance:  no distress


Head:  normocephalic, atraumatic


Eyes:  normal inspection, sclerae normal


ENT:  normal ENT inspection, hearing grossly normal, pharynx normal


Neck:  supple, thyroid normal, trachea midline


Respiratory/Chest:  chest non-tender, lungs clear, no respiratory distress, no 

accessory muscle use, good air entry, no wheezing


Cardiovascular:  regular rate, rhythm


Abdomen/GI:  non tender, soft, no organomegaly


Back:  normal inspection


Extremities/Musculoskelatal:  normal inspection, no calf tenderness, no pedal 

edema


Neurologic/Psych:  no motor/sensory deficits, alert, normal mood/affect, 

oriented x 3


Skin:  normal color, warm/dry





Admission CXR:


The chest has an emphysematous configuration. There is a left-sided 

pneumothorax with maximal pleural separation of 20 mm. There is no overt 

failure. Left basilar airspace opacities are likely atelectatic. There is mild 

blunting of the right lateral costophrenic angle.[ 


IMPRESSION: Left-sided pneumothorax with pleural separation of 20 mm





Recent CXR 


There is radiographic evidence of emphysema. There is a stable left apical 

pneumothorax the pleural separation of 21 mm. There is left lower lobe 

collapse. There is stable blunting of the right lateral costophrenic angle.


IMPRESSION: 


1. Persistent left lower lobe collapse


2. Persistent 21 mm left apical pneumothorax





Patient is being followed by CT surgery for LEFT PNEUMOTHORAX. Plans to 

continue with serial X rays. Chest tube is apparently not needed at this time 

although patient was counseled of this possibility if breathing symptoms worsen





Patient currently speaking and breathing comfortably on nasal cannula and 

understands using incentive spirometer





COPD EXACERBATION


   Patient does not require supplemental O2 at home


   Titrate supplemental O2 as tolerated to maintain SaO2 between 88 and 92%


   Continue with IV steroids


   Patient started on azithromycin and ceftriaxone





Hypothyroidism: 


-Cont levothyroxine 





HTN: 


-Cont home dose amlodipine, hctz, lopressor 





Hypokalemia: 


-Replaced


-Cont home dose oral supplements  





Tobacco use disorder:


-Smoking cessation ordered 





Diarrhea:


-H/o colon cancer s/p resection


-Cont home dose Prevalite 


-C diff was ordered





Anemia (iron deficiency, b12 deficiency):


-Hgb within normal range at 15


-Has a history of MAXIMINO but follows with hematology for IV iron injections


-Cont Vit b12, folate supplementation 





DVT Ppx: SCDs


Code status: FULL

## 2018-02-09 NOTE — DIAGNOSTIC IMAGING REPORT
CHEST ONE VIEW PORTABLE



CLINICAL HISTORY: pneumothorax    



COMPARISON STUDY:  February 8, 2018



FINDINGS: There is radiographic evidence of emphysema. There is a stable left

apical pneumothorax the pleural separation of 21 mm. There is left lower lobe

collapse. There is stable blunting of the right lateral costophrenic angle.[ 



IMPRESSION: 

1. Persistent left lower lobe collapse

2. Persistent 21 mm left apical pneumothorax







Electronically signed by:  Sidney Guevara M.D.

2/9/2018 7:11 AM



Dictated Date/Time:  2/9/2018 7:09 AM

## 2018-02-10 VITALS — HEART RATE: 81 BPM | OXYGEN SATURATION: 96 %

## 2018-02-10 VITALS
HEART RATE: 89 BPM | SYSTOLIC BLOOD PRESSURE: 168 MMHG | TEMPERATURE: 98.06 F | OXYGEN SATURATION: 93 % | DIASTOLIC BLOOD PRESSURE: 81 MMHG

## 2018-02-10 VITALS
TEMPERATURE: 97.7 F | SYSTOLIC BLOOD PRESSURE: 139 MMHG | HEART RATE: 103 BPM | DIASTOLIC BLOOD PRESSURE: 79 MMHG | OXYGEN SATURATION: 95 %

## 2018-02-10 VITALS
HEART RATE: 94 BPM | SYSTOLIC BLOOD PRESSURE: 157 MMHG | DIASTOLIC BLOOD PRESSURE: 72 MMHG | TEMPERATURE: 98.78 F | OXYGEN SATURATION: 94 %

## 2018-02-10 VITALS — OXYGEN SATURATION: 94 %

## 2018-02-10 VITALS — OXYGEN SATURATION: 93 % | HEART RATE: 82 BPM

## 2018-02-10 VITALS
DIASTOLIC BLOOD PRESSURE: 68 MMHG | TEMPERATURE: 97.7 F | OXYGEN SATURATION: 94 % | HEART RATE: 82 BPM | SYSTOLIC BLOOD PRESSURE: 133 MMHG

## 2018-02-10 VITALS
OXYGEN SATURATION: 92 % | DIASTOLIC BLOOD PRESSURE: 73 MMHG | SYSTOLIC BLOOD PRESSURE: 129 MMHG | TEMPERATURE: 98.06 F | HEART RATE: 82 BPM

## 2018-02-10 VITALS — OXYGEN SATURATION: 93 % | HEART RATE: 80 BPM

## 2018-02-10 VITALS — OXYGEN SATURATION: 92 %

## 2018-02-10 VITALS — OXYGEN SATURATION: 94 % | HEART RATE: 103 BPM

## 2018-02-10 LAB
ALBUMIN SERPL-MCNC: 3.3 GM/DL (ref 3.4–5)
ALP SERPL-CCNC: 72 U/L (ref 45–117)
ALT SERPL-CCNC: 22 U/L (ref 12–78)
AST SERPL-CCNC: 26 U/L (ref 15–37)
BASOPHILS # BLD: 0 K/UL (ref 0–0.2)
BASOPHILS NFR BLD: 0 %
BUN SERPL-MCNC: 27 MG/DL (ref 7–18)
CALCIUM SERPL-MCNC: 8.9 MG/DL (ref 8.5–10.1)
CO2 SERPL-SCNC: 27 MMOL/L (ref 21–32)
CREAT SERPL-MCNC: 1.09 MG/DL (ref 0.6–1.2)
EOS ABS #: 0 K/UL (ref 0–0.5)
EOSINOPHIL NFR BLD AUTO: 142 K/UL (ref 130–400)
GLUCOSE SERPL-MCNC: 128 MG/DL (ref 70–99)
HCT VFR BLD CALC: 39.4 % (ref 37–47)
HGB BLD-MCNC: 13.3 G/DL (ref 12–16)
IG#: 0.03 K/UL (ref 0–0.02)
IMM GRANULOCYTES NFR BLD AUTO: 2.6 %
LYMPHOCYTES # BLD: 0.35 K/UL (ref 1.2–3.4)
MCH RBC QN AUTO: 31.1 PG (ref 25–34)
MCHC RBC AUTO-ENTMCNC: 33.8 G/DL (ref 32–36)
MCV RBC AUTO: 92.3 FL (ref 80–100)
MONO ABS #: 0.37 K/UL (ref 0.11–0.59)
MONOCYTES NFR BLD: 2.7 %
NEUT ABS #: 12.81 K/UL (ref 1.4–6.5)
NEUTROPHILS # BLD AUTO: 0 %
NEUTROPHILS NFR BLD AUTO: 94.5 %
PMV BLD AUTO: 9.8 FL (ref 7.4–10.4)
POTASSIUM SERPL-SCNC: 3.9 MMOL/L (ref 3.5–5.1)
PROT SERPL-MCNC: 6.5 GM/DL (ref 6.4–8.2)
RED CELL DISTRIBUTION WIDTH CV: 13.3 % (ref 11.5–14.5)
RED CELL DISTRIBUTION WIDTH SD: 44.5 FL (ref 36.4–46.3)
SODIUM SERPL-SCNC: 137 MMOL/L (ref 136–145)
WBC # BLD AUTO: 13.56 K/UL (ref 4.8–10.8)

## 2018-02-10 PROCEDURE — 0W9B30Z DRAINAGE OF LEFT PLEURAL CAVITY WITH DRAINAGE DEVICE, PERCUTANEOUS APPROACH: ICD-10-PCS | Performed by: THORACIC SURGERY (CARDIOTHORACIC VASCULAR SURGERY)

## 2018-02-10 RX ADMIN — METHYLPREDNISOLONE SODIUM SUCCINATE SCH MLS/MIN: 1 INJECTION, POWDER, FOR SOLUTION INTRAMUSCULAR; INTRAVENOUS at 06:05

## 2018-02-10 RX ADMIN — CHOLESTYRAMINE SCH GM: 4 POWDER, FOR SUSPENSION ORAL at 19:57

## 2018-02-10 RX ADMIN — METHYLPREDNISOLONE SODIUM SUCCINATE SCH MLS/MIN: 1 INJECTION, POWDER, FOR SOLUTION INTRAMUSCULAR; INTRAVENOUS at 19:57

## 2018-02-10 RX ADMIN — ALUMINUM ZIRCONIUM TRICHLOROHYDREX GLY SCH EA: 0.2 STICK TOPICAL at 14:35

## 2018-02-10 RX ADMIN — POTASSIUM CHLORIDE SCH MEQ: 1500 TABLET, EXTENDED RELEASE ORAL at 08:54

## 2018-02-10 RX ADMIN — METOPROLOL TARTRATE SCH MG: 50 TABLET, FILM COATED ORAL at 08:54

## 2018-02-10 RX ADMIN — IPRATROPIUM BROMIDE AND ALBUTEROL SULFATE SCH ML: .5; 3 SOLUTION RESPIRATORY (INHALATION) at 11:21

## 2018-02-10 RX ADMIN — CHOLESTYRAMINE SCH GM: 4 POWDER, FOR SUSPENSION ORAL at 11:32

## 2018-02-10 RX ADMIN — IPRATROPIUM BROMIDE AND ALBUTEROL SULFATE SCH ML: .5; 3 SOLUTION RESPIRATORY (INHALATION) at 15:15

## 2018-02-10 RX ADMIN — ALUMINUM ZIRCONIUM TRICHLOROHYDREX GLY SCH EA: 0.2 STICK TOPICAL at 08:00

## 2018-02-10 RX ADMIN — IPRATROPIUM BROMIDE AND ALBUTEROL SULFATE SCH ML: .5; 3 SOLUTION RESPIRATORY (INHALATION) at 19:26

## 2018-02-10 RX ADMIN — AMLODIPINE BESYLATE SCH MG: 5 TABLET ORAL at 08:53

## 2018-02-10 RX ADMIN — OXYCODONE HYDROCHLORIDE AND ACETAMINOPHEN SCH MG: 500 TABLET ORAL at 08:53

## 2018-02-10 RX ADMIN — Medication SCH MCG: at 08:53

## 2018-02-10 RX ADMIN — OXYCODONE HYDROCHLORIDE PRN MG: 5 TABLET ORAL at 13:34

## 2018-02-10 RX ADMIN — CEFTRIAXONE SODIUM SCH MLS/HR: 1 INJECTION, POWDER, FOR SOLUTION INTRAVENOUS at 13:34

## 2018-02-10 RX ADMIN — ALUMINUM ZIRCONIUM TRICHLOROHYDREX GLY SCH EA: 0.2 STICK TOPICAL at 20:08

## 2018-02-10 RX ADMIN — Medication SCH INTER.UNIT: at 08:53

## 2018-02-10 RX ADMIN — OXYCODONE HYDROCHLORIDE PRN MG: 5 TABLET ORAL at 17:01

## 2018-02-10 RX ADMIN — AZITHROMYCIN SCH MG: 250 TABLET, FILM COATED ORAL at 08:53

## 2018-02-10 RX ADMIN — HYDROCHLOROTHIAZIDE SCH MG: 25 TABLET ORAL at 08:54

## 2018-02-10 RX ADMIN — IPRATROPIUM BROMIDE AND ALBUTEROL SULFATE SCH ML: .5; 3 SOLUTION RESPIRATORY (INHALATION) at 07:27

## 2018-02-10 RX ADMIN — OXYCODONE HYDROCHLORIDE PRN MG: 5 TABLET ORAL at 22:05

## 2018-02-10 RX ADMIN — LEVOTHYROXINE SODIUM SCH MCG: 88 TABLET ORAL at 06:04

## 2018-02-10 RX ADMIN — METOPROLOL TARTRATE SCH MG: 50 TABLET, FILM COATED ORAL at 19:56

## 2018-02-10 RX ADMIN — ALUMINUM ZIRCONIUM TRICHLOROHYDREX GLY SCH EA: 0.2 STICK TOPICAL at 00:00

## 2018-02-10 RX ADMIN — METHYLPREDNISOLONE SODIUM SUCCINATE SCH MLS/MIN: 1 INJECTION, POWDER, FOR SOLUTION INTRAMUSCULAR; INTRAVENOUS at 13:34

## 2018-02-10 NOTE — OPERATIVE REPORT
DATE OF OPERATION:  02/10/2018

 

PREOPERATIVE DIAGNOSIS:  Spontaneous left pneumothorax.

 

POSTOPERATIVE DIAGNOSIS:  Same.

 

PROCEDURE:  Insertion of a left 20-Gibraltarian chest tube under ultrasound

guidance.

 

SURGEON:  Dr. Thakur.

 

ANESTHESIA:  Local.

 

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

active cigarette smoker, who weighs 97 pounds.  She presented with

spontaneous pneumothorax 2 days ago, but it was small and I elected to watch

this.  She was not enthralled with getting a chest tube inserted.  I followed

her and did not think that her chest x-ray yesterday looked much different,

but today's basilar component is much larger.  She has also become more

symptomatic.  For this reason, I elected to proceed with insertion of a small

chest tube.

 

DESCRIPTION OF PROCEDURE:  After appropriate consent had been signed and a

timeout had been called, the patient was placed in right lateral decubitus

position.  The left chest prepped and draped in usual sterile fashion.  After

using ultrasound to chance an area that we had a nice window.  A 25 gauge

needle, 1% Xylocaine was used to anesthetize the skin and subcutaneous

tissues and a larger needle was used to anesthetize the intercostal muscles

and the pleura.  A guidewire was inserted through the needle and we obtained

air and the needle removed.  A small dilator was used to gently dilate this

up and then removed.  I did direct this inferiorly as I was afraid there may

be attachments superiorly.  A 20-Gibraltarian chest tube with an inner cannula was

placed over the guidewire without difficulty and was directed posteriorly and

medially.  There was a rush of air.  I sutured in at about 10 cm.  We had

good fluctuation.  It should be noted that this incision was quite small and

just big enough for this 20-Gibraltarian chest tube.  She had a small air leak and

at the conclusion her x-ray showed essentially total expansion of the lung. 

She tolerated it quite well.  Antimicrobial dressings are placed.

 

 

I attest to the content of the Intraoperative Record and any orders documented therein. Any exception
s are noted below.

## 2018-02-10 NOTE — DIAGNOSTIC IMAGING REPORT
CHEST ONE VIEW PORTABLE



CLINICAL HISTORY: 83 years-old Female presenting with s/p chest tube. 



TECHNIQUE: Portable upright AP view of the chest was obtained.



COMPARISON: 2/10/2018 at 6:50 AM.



FINDINGS:

Interval placement of a large bore left pleural drain positioned at the left

base. Atherosclerosis of the aortic arch. Cardiac silhouette normal in size.

Significant interval decrease in left lower lobe atelectasis. Significant

decrease in left basilar predominant pneumothorax. Lungs overall remain

hyperinflated with relative lucency at the upper lobes.. Osteopenia suspected.

Upper abdomen normal.



IMPRESSION:

1.  Large bore left pleural drain positioned at the left base with significant

decrease in left lower lobe atelectasis and left pneumothorax.







Electronically signed by:  Sai Fraga M.D.

2/10/2018 11:00 AM



Dictated Date/Time:  2/10/2018 10:58 AM

## 2018-02-10 NOTE — SURGERY PROGRESS NOTE
DATE: 02/10/2018

 

SUBJECTIVE:  Ms. Vilchis was seen today.  Her chest x-ray shows she has had an

increasing size of her pneumothorax.  She is also having a bit more pain and

coughing a bit more.  She really has not desaturated much, although she is

still requiring oxygen.  I had a long talk with the patient and her son.  I

think we should insert a chest tube.  I will use a small 20 [place]ian chest tube

and will do it under ultrasound guidance.  I explained this to the patient. 

She is an RN and agreeable.

## 2018-02-10 NOTE — PROGRESS NOTE
Internal Med Progress Note


Date of Service:


Feb 10, 2018.


Provider Documentation:


Subjective: patient is s/p left chest tube placement today by CT surgery





Physical Exam


General Appearance:  no distress


Head:  normocephalic, atraumatic


Eyes:  normal inspection, sclerae normal


ENT:  normal ENT inspection, hearing grossly normal, pharynx normal


Neck:  supple, thyroid normal, trachea midline


Respiratory/Chest:  chest tube placed in left posterior back, good air entry, 

no wheezing


Cardiovascular:  regular rate, rhythm


Abdomen/GI:  non tender, soft, no organomegaly


Back:  normal inspection


Extremities/Musculoskelatal:  normal inspection, no calf tenderness, no pedal 

edema


Neurologic/Psych:  no motor/sensory deficits, alert, normal mood/affect, 

oriented x 3


Skin:  normal color, warm/dry


ASSESSMENT & PLAN:


Admission CXR 2/8/18:


The chest has an emphysematous configuration. There is a left-sided 

pneumothorax with maximal pleural separation of 20 mm. There is no overt 

failure. Left basilar airspace opacities are likely atelectatic. There is mild 

blunting of the right lateral costophrenic angle. IMPRESSION: Left-sided 

pneumothorax with pleural separation of 20 mm





LEFT PNEUMOTHORAX. 


s/p left chest tube placement on 2/10/18 by CT surgery


Post chest tube CXR 2/10/18: Large bore left pleural drain positioned at the 

left base with significant decrease in left lower lobe atelectasis and left 

pneumothorax


Appreciate further CT surgery consultation recommendations on management of the 

chest tube





COPD EXACERBATION


Patient does not require supplemental O2 at home


Titrate supplemental O2 as tolerated to maintain SaO2 between 88 and 92%


Continue with IV steroids


Patient started on azithromycin and ceftriaxone





Hypothyroidism: Continue levothyroxine 





HTN: Continue home dose amlodipine, hctz, lopressor 





Hypokalemia: resolved after potassium repletion on this admission





Tobacco use disorder: Smoking cessation ordered and advised





Diarrhea:


-H/o colon cancer s/p resection


-Cont home dose Prevalite 





Anemia (iron deficiency, b12 deficiency):


-Has a history of MAXIMINO and follows with hematology for IV iron injections


-Cont Vit b12, folate supplementation 


-Monitor CBC s/p chest tube





SCD ppx


Vital Signs:











  Date Time  Temp Pulse Resp B/P (MAP) Pulse Ox O2 Delivery O2 Flow Rate FiO2


 


2/10/18 11:21  81 18  96 Nasal Cannula 3.0 


 


2/10/18 08:03     92 Nasal Cannula 3.0 


 


2/10/18 07:49 36.7 89 18 168/81 (110) 93  3.0 


 


2/10/18 07:31  80 18  93 Nasal Cannula 3.0 


 


2/10/18 04:00      Nasal Cannula 3.0 


 


2/10/18 03:44 36.5 82 18 133/68 (89) 94 Nasal Cannula 3.0 


 


2/10/18 00:00      Nasal Cannula 3.0 


 


2/9/18 23:44 36.7 86 20 122/66 (84) 96 Nasal Cannula 3.0 


 


2/9/18 21:05 37.0 90 20  92  3.0 


 


2/9/18 20:00 36.6 104 20 129/84 (99) 91 Nasal Cannula 3.0 


 


2/9/18 20:00      Nasal Cannula 3.0 


 


2/9/18 19:05  90 20  92 Nasal Cannula 3.0 


 


2/9/18 16:00     92 Nasal Cannula 3.0 


 


2/9/18 15:45 37.0 83 18 115/70 (85) 92 Nasal Cannula 3.0 


 


2/9/18 15:17  85   93   


 


2/9/18 14:37  76 20  92 Nasal Cannula 3.0 


 


2/9/18 12:02      Nasal Cannula 3.0 


 


2/9/18 11:41 36.7 78 23 147/78 (101) 92 Nasal Cannula 2.0 








Lab Results:





Results Past 24 Hours








Test


  2/10/18


09:53 Range/Units


 


 


White Blood Count 13.56 4.8-10.8  K/uL


 


Red Blood Count 4.27 4.2-5.4  M/uL


 


Hemoglobin 13.3 12.0-16.0  g/dL


 


Hematocrit 39.4 37-47  %


 


Mean Corpuscular Volume 92.3   fL


 


Mean Corpuscular Hemoglobin 31.1 25-34  pg


 


Mean Corpuscular Hemoglobin


Concent 33.8


  32-36  g/dl


 


 


Platelet Count 142 130-400  K/uL


 


Mean Platelet Volume 9.8 7.4-10.4  fL


 


Neutrophils (%) (Auto) 94.5  %


 


Lymphocytes (%) (Auto) 2.6  %


 


Monocytes (%) (Auto) 2.7  %


 


Eosinophils (%) (Auto) 0.0  %


 


Basophils (%) (Auto) 0.0  %


 


Neutrophils # (Auto) 12.81 1.4-6.5  K/uL


 


Lymphocytes # (Auto) 0.35 1.2-3.4  K/uL


 


Monocytes # (Auto) 0.37 0.11-0.59  K/uL


 


Eosinophils # (Auto) 0.00 0-0.5  K/uL


 


Basophils # (Auto) 0.00 0-0.2  K/uL


 


RDW Standard Deviation 44.5 36.4-46.3  fL


 


RDW Coefficient of Variation 13.3 11.5-14.5  %


 


Immature Granulocyte % (Auto) 0.2  %


 


Immature Granulocyte # (Auto) 0.03 0.00-0.02  K/uL


 


Sodium Level 137 136-145  mmol/L


 


Potassium Level 3.9 3.5-5.1  mmol/L


 


Chloride Level 102   mmol/L


 


Carbon Dioxide Level 27 21-32  mmol/L


 


Anion Gap 8.0 3-11  mmol/L


 


Blood Urea Nitrogen 27 7-18  mg/dl


 


Creatinine


  1.09


  0.60-1.20


mg/dl


 


Est Creatinine Clear Calc


Drug Dose 28.4


   ml/min


 


 


Estimated GFR (


American) 54.4


   


 


 


Estimated GFR (Non-


American 46.9


   


 


 


BUN/Creatinine Ratio 24.5 10-20  


 


Random Glucose 128 70-99  mg/dl


 


Calcium Level 8.9 8.5-10.1  mg/dl


 


Total Bilirubin 0.7 0.2-1  mg/dl


 


Aspartate Amino Transf


(AST/SGOT) 26


  15-37  U/L


 


 


Alanine Aminotransferase


(ALT/SGPT) 22


  12-78  U/L


 


 


Alkaline Phosphatase 72   U/L


 


Total Protein 6.5 6.4-8.2  gm/dl


 


Albumin 3.3 3.4-5.0  gm/dl


 


Globulin 3.2 2.5-4.0  gm/dl


 


Albumin/Globulin Ratio 1.0 0.9-2

## 2018-02-10 NOTE — DIAGNOSTIC IMAGING REPORT
CHEST ONE VIEW PORTABLE



CLINICAL HISTORY: 83 years-old Female presenting with serial XR for

pneumothorax. 



TECHNIQUE: Portable upright AP view of the chest was obtained.



COMPARISON: 2/9/2018.



FINDINGS:

Atherosclerosis of aortic arch. Cardiac silhouette enlarged. Persistent collapse

of the left lower lobe. Moderate left pneumothorax with a pronounced basilar

component, unchanged. Hyperinflation of the remaining lungs with heterogeneous

radiolucency of the apices. Osteopenia suspected. Upper abdomen normal.



IMPRESSION:

1.  No significant change in left pneumothorax.



2.  Persistent left lower lobe collapse.



3.  Emphysema.







Electronically signed by:  Sai Fraga M.D.

2/10/2018 7:23 AM



Dictated Date/Time:  2/10/2018 7:21 AM

## 2018-02-10 NOTE — PULMONARY PROGRESS NOTE
DATE: 02/10/2018

 

CHIEF COMPLAINT:  COPD/spontaneous pneumothorax.

 

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

spontaneous pneumothorax requiring closed tube thoracostomy insertion by Dr. Thakur this morning, feels infinitely better.  She is having a great deal

of left-sided chest discomfort and supplemented with oxygen.  She was sent

home at a close to 20 mm separation at the left apex before the chest tube

was inserted.  She had a close to 65-year-pack smoking history.

 

For details of past medical history, medications, family and social history,

I refer you to Jose Henriquez's well-outlined dictation.

 

PHYSICAL EXAMINATION:

GENERAL:  Well-developed, thin, cachectic white female in no obvious

respiratory distress currently.

VITAL SIGNS:  Temperature 36.7, pulse 82 and regular, respiratory rate 18,

blood pressure 129/73, O2 saturation 92% on 3 liters.

SKIN:  Without lesion.

HEENT:  Atraumatic, normocephalic.  PERRLA, EOMI.  Conjunctivae pale. 

Sclerae nonicteric.  Fundi poorly visualized.

NECK:  Veins not distended at 45 degrees.  No adenopathy.

CHEST:  Markedly decreased breath sounds with marked prolongation of the

expiratory phase and expiratory wheeze heard at the left base.

CARDIAC:  Regular rhythm.  I do not appreciate a gallop.

ABDOMEN:  Soft, scaphoid.

EXTREMITIES:  No pedal edema, clubbing or cyanosis.

NEUROLOGIC:  Intact.  No lateralizing signs.

 

OVERALL ASSESSMENT:  An 83-year-old with close to a 65-pack-year smoking

history with severe end-stage chronic obstructive pulmonary disease who

presents with spontaneous pneumothorax, presumably from a ruptured bleb at

the apex, status post closed tube thoracostomy with virtually complete

expansion of the left lung, currently being treated with IV ceftriaxone and

IV methylprednisolone along with aerosolized bronchodilator.  I do not

discern an air leak currently and hopefully chest tube can be removed within

48-72 hours if the air leak totally resolves.  Unfortunately, this event can

be repeated and if recurrent spontaneous pneumothoraces occur, especially in

a patient with limited to virtually no ventilatory reserve then a VATS

procedure with bleb resection and possible talc pleurodesis will be

indicated.

## 2018-02-11 VITALS
OXYGEN SATURATION: 93 % | HEART RATE: 77 BPM | TEMPERATURE: 97.52 F | SYSTOLIC BLOOD PRESSURE: 151 MMHG | DIASTOLIC BLOOD PRESSURE: 78 MMHG

## 2018-02-11 VITALS
HEART RATE: 99 BPM | SYSTOLIC BLOOD PRESSURE: 120 MMHG | TEMPERATURE: 97.88 F | DIASTOLIC BLOOD PRESSURE: 73 MMHG | OXYGEN SATURATION: 92 %

## 2018-02-11 VITALS
TEMPERATURE: 98.42 F | OXYGEN SATURATION: 98 % | DIASTOLIC BLOOD PRESSURE: 90 MMHG | SYSTOLIC BLOOD PRESSURE: 172 MMHG | HEART RATE: 93 BPM

## 2018-02-11 VITALS
SYSTOLIC BLOOD PRESSURE: 143 MMHG | OXYGEN SATURATION: 93 % | HEART RATE: 76 BPM | DIASTOLIC BLOOD PRESSURE: 69 MMHG | TEMPERATURE: 97.88 F

## 2018-02-11 VITALS
TEMPERATURE: 97.16 F | OXYGEN SATURATION: 99 % | DIASTOLIC BLOOD PRESSURE: 75 MMHG | HEART RATE: 72 BPM | SYSTOLIC BLOOD PRESSURE: 134 MMHG

## 2018-02-11 VITALS — OXYGEN SATURATION: 92 % | HEART RATE: 81 BPM

## 2018-02-11 VITALS — OXYGEN SATURATION: 91 %

## 2018-02-11 VITALS
DIASTOLIC BLOOD PRESSURE: 87 MMHG | OXYGEN SATURATION: 99 % | HEART RATE: 72 BPM | TEMPERATURE: 97.34 F | SYSTOLIC BLOOD PRESSURE: 148 MMHG

## 2018-02-11 VITALS
DIASTOLIC BLOOD PRESSURE: 82 MMHG | SYSTOLIC BLOOD PRESSURE: 158 MMHG | TEMPERATURE: 98.06 F | HEART RATE: 89 BPM | OXYGEN SATURATION: 91 %

## 2018-02-11 VITALS — OXYGEN SATURATION: 96 %

## 2018-02-11 VITALS — HEART RATE: 81 BPM | OXYGEN SATURATION: 98 %

## 2018-02-11 VITALS — HEART RATE: 80 BPM | OXYGEN SATURATION: 97 %

## 2018-02-11 VITALS — OXYGEN SATURATION: 93 % | HEART RATE: 98 BPM

## 2018-02-11 VITALS — OXYGEN SATURATION: 98 %

## 2018-02-11 LAB
ALBUMIN SERPL-MCNC: 3.1 GM/DL (ref 3.4–5)
ALP SERPL-CCNC: 62 U/L (ref 45–117)
ALT SERPL-CCNC: 31 U/L (ref 12–78)
AST SERPL-CCNC: 35 U/L (ref 15–37)
BASOPHILS # BLD: 0 K/UL (ref 0–0.2)
BASOPHILS NFR BLD: 0 %
BUN SERPL-MCNC: 26 MG/DL (ref 7–18)
CALCIUM SERPL-MCNC: 9 MG/DL (ref 8.5–10.1)
CO2 SERPL-SCNC: 31 MMOL/L (ref 21–32)
CREAT SERPL-MCNC: 0.69 MG/DL (ref 0.6–1.2)
EOS ABS #: 0 K/UL (ref 0–0.5)
EOSINOPHIL NFR BLD AUTO: 143 K/UL (ref 130–400)
GLUCOSE SERPL-MCNC: 100 MG/DL (ref 70–99)
HCT VFR BLD CALC: 39.8 % (ref 37–47)
HGB BLD-MCNC: 13.6 G/DL (ref 12–16)
IG#: 0.02 K/UL (ref 0–0.02)
IMM GRANULOCYTES NFR BLD AUTO: 5.6 %
LYMPHOCYTES # BLD: 0.56 K/UL (ref 1.2–3.4)
MCH RBC QN AUTO: 31.4 PG (ref 25–34)
MCHC RBC AUTO-ENTMCNC: 34.2 G/DL (ref 32–36)
MCV RBC AUTO: 91.9 FL (ref 80–100)
MONO ABS #: 0.53 K/UL (ref 0.11–0.59)
MONOCYTES NFR BLD: 5.3 %
NEUT ABS #: 8.98 K/UL (ref 1.4–6.5)
NEUTROPHILS # BLD AUTO: 0 %
NEUTROPHILS NFR BLD AUTO: 88.9 %
PMV BLD AUTO: 9.7 FL (ref 7.4–10.4)
POTASSIUM SERPL-SCNC: 3.8 MMOL/L (ref 3.5–5.1)
PROT SERPL-MCNC: 5.9 GM/DL (ref 6.4–8.2)
RED CELL DISTRIBUTION WIDTH CV: 13.2 % (ref 11.5–14.5)
RED CELL DISTRIBUTION WIDTH SD: 44.5 FL (ref 36.4–46.3)
SODIUM SERPL-SCNC: 138 MMOL/L (ref 136–145)
WBC # BLD AUTO: 10.09 K/UL (ref 4.8–10.8)

## 2018-02-11 RX ADMIN — METHYLPREDNISOLONE SODIUM SUCCINATE SCH MLS/MIN: 1 INJECTION, POWDER, FOR SOLUTION INTRAMUSCULAR; INTRAVENOUS at 13:56

## 2018-02-11 RX ADMIN — AMLODIPINE BESYLATE SCH MG: 5 TABLET ORAL at 08:59

## 2018-02-11 RX ADMIN — POTASSIUM CHLORIDE SCH MEQ: 1500 TABLET, EXTENDED RELEASE ORAL at 08:58

## 2018-02-11 RX ADMIN — Medication SCH INTER.UNIT: at 08:56

## 2018-02-11 RX ADMIN — OXYCODONE HYDROCHLORIDE PRN MG: 5 TABLET ORAL at 15:52

## 2018-02-11 RX ADMIN — METHYLPREDNISOLONE SODIUM SUCCINATE SCH MLS/MIN: 1 INJECTION, POWDER, FOR SOLUTION INTRAMUSCULAR; INTRAVENOUS at 21:54

## 2018-02-11 RX ADMIN — Medication SCH MCG: at 08:56

## 2018-02-11 RX ADMIN — METOPROLOL TARTRATE SCH MG: 50 TABLET, FILM COATED ORAL at 08:58

## 2018-02-11 RX ADMIN — IPRATROPIUM BROMIDE AND ALBUTEROL SULFATE SCH ML: .5; 3 SOLUTION RESPIRATORY (INHALATION) at 07:37

## 2018-02-11 RX ADMIN — ALUMINUM ZIRCONIUM TRICHLOROHYDREX GLY SCH EA: 0.2 STICK TOPICAL at 08:00

## 2018-02-11 RX ADMIN — AZITHROMYCIN SCH MG: 250 TABLET, FILM COATED ORAL at 08:57

## 2018-02-11 RX ADMIN — CHOLESTYRAMINE SCH GM: 4 POWDER, FOR SUSPENSION ORAL at 10:00

## 2018-02-11 RX ADMIN — CHOLESTYRAMINE SCH GM: 4 POWDER, FOR SUSPENSION ORAL at 21:54

## 2018-02-11 RX ADMIN — OXYCODONE HYDROCHLORIDE PRN MG: 5 TABLET ORAL at 21:54

## 2018-02-11 RX ADMIN — IPRATROPIUM BROMIDE AND ALBUTEROL SULFATE SCH ML: .5; 3 SOLUTION RESPIRATORY (INHALATION) at 19:15

## 2018-02-11 RX ADMIN — OXYCODONE HYDROCHLORIDE AND ACETAMINOPHEN SCH MG: 500 TABLET ORAL at 08:58

## 2018-02-11 RX ADMIN — METHYLPREDNISOLONE SODIUM SUCCINATE SCH MLS/MIN: 1 INJECTION, POWDER, FOR SOLUTION INTRAMUSCULAR; INTRAVENOUS at 06:05

## 2018-02-11 RX ADMIN — LEVOTHYROXINE SODIUM SCH MCG: 88 TABLET ORAL at 06:05

## 2018-02-11 RX ADMIN — HYDROCHLOROTHIAZIDE SCH MG: 25 TABLET ORAL at 08:57

## 2018-02-11 RX ADMIN — ALUMINUM ZIRCONIUM TRICHLOROHYDREX GLY SCH EA: 0.2 STICK TOPICAL at 15:22

## 2018-02-11 RX ADMIN — IPRATROPIUM BROMIDE AND ALBUTEROL SULFATE SCH ML: .5; 3 SOLUTION RESPIRATORY (INHALATION) at 11:09

## 2018-02-11 RX ADMIN — CEFTRIAXONE SODIUM SCH MLS/HR: 1 INJECTION, POWDER, FOR SOLUTION INTRAVENOUS at 13:56

## 2018-02-11 RX ADMIN — IPRATROPIUM BROMIDE AND ALBUTEROL SULFATE SCH ML: .5; 3 SOLUTION RESPIRATORY (INHALATION) at 15:09

## 2018-02-11 RX ADMIN — METOPROLOL TARTRATE SCH MG: 50 TABLET, FILM COATED ORAL at 20:28

## 2018-02-11 NOTE — SURGERY PROGRESS NOTE
DATE: 02/11/2018

 

SUBJECTIVE:  Mr. Vilchis was seen today.  She has tolerated his chest tube very

nicely.  Her x-ray shows essentially total reexpansion of her lung.  This

very thin lucency at the top of the pleural cavity may or may not be a

pneumothorax.  Anyway, she has no air leak.  I think she looks quite good. 

Her pain control has been very good.  She is ambulating in the hallway.  I am

going to keep her chest tube in for at least another 24 hours.  We may want

to remove it tomorrow.  I am going to order a CT scan today to assess for any

large bullae or a mass.

## 2018-02-11 NOTE — DIAGNOSTIC IMAGING REPORT
(CHEST) THORAX WITHOUT



CT DOSE: 223.64 mGy.cm



HISTORY: Emphysematous change  parenchymal abnormalities



TECHNIQUE: Multiaxial CT images of the chest were performed without contrast.  A

dose lowering technique was utilized adhering to the principles of ALARA.





COMPARISON: None.



FINDINGS: Mild bilateral apical fibrotic change. Very small left apical

pneumothorax. Moderate bilateral emphysematous change. Several small blebs

throughout both hemithoraces.



Slight interstitial and peribronchial prominence. No well-defined focal

infiltrate. 3 mm calcified granuloma right middle lobe transaxial image 180.

Irregular lesion marginated 6 mm pleural-based nodule right lower lobe

transaxial image 204. Left-sided chest tube with mild localized subcutaneous

emphysematous-type change. Several small basilar bleb-like changes with minimal

dependent basilar atelectatic change. Tortuosity and ectasia thoracic aorta.

Trace pleural fluid right base.



IMPRESSION: 



1. Left-sided chest tube in position with a small amount of subcutaneous

emphysematous change of the left lateral chest wall.





2. Very small residual left apical pneumothorax.





3. Moderate emphysematous change with small multifocal blebs bilaterally.

4. 6 mm slightly spiculated lesion peripheral aspect right lower lobe image 206.



5. Follow-up of this nodule should be performed per clinical suspicion or

Fleischner criteria.

6. Slight chronic interstitial prominence with no evidence for a focal

infiltrate.



Please refer to below summary of Fleischner criteria recommendations for

follow-up of incidental CT nodules (ASAEL Nance, Guidelines for management of

small pulmonary nodules detected on CT scans:  A statement from the Fleischner

Society, Radiology 237: [phone].)



SOLID NODULES



Solitary nodule size: <6 mm

*  low risk patients:  no follow-up needed

*  high risk patients:  optional CT at 12 months



Solitary nodule size:  6-8 mm

*  low risk patients:  follow-up at 6-12 months, then consider further follow-up

at 18-24 months

*  high risk patients:  initial follow-up CT at 6-12 months and then at 18-24

months if no change



Solitary nodule size: >8 mm

*  either low or high risk patients - consider follow-up CT at 3 months, and/or

CT-PET, and/or biopsy



Multiple nodules size:  <6 mm

*  low risk patients:  no routine follow-up

*  high risk patients:  optional CT at 12 months



Multiple nodules size:  6-8 mm

*  low risk patients:  follow-up at 3-6 months, then consider further follow-up

at 18-24 months

*  high risk patients:  follow-up at 3-6 months, then at 18-24 months if no

change



Multiple nodules size:  >8 mm

*  low risk patients:  follow-up at 3-6 months, then consider further follow-up

at 18-24 months

*  high risk patients:  follow-up at 3-6 months, then at 18-24 months if no

change



Note:  newly detected indeterminate nodule in persons 35 years of age or older.

*  low risk patients:  minimal or absent history of smoking and/or other known

risk factors

*  high risk patients:  history of smoking or of other known risk factors (e.g.

first degree relative with lung cancer, or exposure to asbestos, radon, uranium)

*  if a nodule up to 8 mm is partly solid or is ground glass further follow-up

is required after 24 months to exclude possible slow growing adenocarcinoma

(HUBERT)



SUBSOLID NODULES



Solitary pure ground-glass nodule

*  nodule size <6 mm - no CT follow-up required

*  nodule size >=6 mm - follow-up CT at 6-12 months, then every 2 years until 5

years



Solitary part-solid nodule

*  nodule size <6 mm - no CT follow-up required

*  nodule size >=6 mm - follow-up CT at 3-6 months.  If unchanged, and solid

component remains <6 mm, then annual follow-up for 5 years



Multiple subsolid nodules

*  nodule size <6 mm - follow-up CT at 3-6 months, consider further follow-up at

2 and 4 years if stable

*  nodule size >=6 mm - follow-up CT at 3-6 months, subsequent management based

on the most suspicious nodule(s)







       









The above report was generated using voice recognition software.  It may contain

grammatical, syntax or spelling errors.







Electronically signed by:  Shahriar Gray M.D.

2/11/2018 11:09 AM



Dictated Date/Time:  2/11/2018 11:02 AM

## 2018-02-11 NOTE — DIAGNOSTIC IMAGING REPORT
CHEST ONE VIEW PORTABLE



CLINICAL HISTORY: s/p VATS postoperative evaluation



COMPARISON STUDY:  2/10/2018



FINDINGS: Stable appearance to a left basilar chest tube. No significant

pneumothorax. Baseline interstitial change throughout both hemithoraces

considered stable. Decrease in size of a trace residual left apical pneumothorax

with a maximum current pleural separation of 3 mm. 



IMPRESSION:  Improved exam. Trace residual left apical pneumothorax with a

pleural separation at 3 mm. Mild chronic interstitial change bilaterally 











The above report was generated using voice recognition software.  It may contain

grammatical, syntax or spelling errors.









Electronically signed by:  Shahriar Gray M.D.

2/11/2018 7:34 AM



Dictated Date/Time:  2/11/2018 7:33 AM

## 2018-02-11 NOTE — PROGRESS NOTE
Internal Med Progress Note


Date of Service:


Feb 11, 2018.


Provider Documentation:


Subjective: Patient needed straight cath for urinary retention overnight. 

Patient does not report problems with chest tube today. 





Physical Exam


General Appearance:  no distress


Head:  normocephalic, atraumatic


Eyes:  normal inspection, sclerae normal


ENT:  normal ENT inspection, hearing grossly normal, pharynx normal


Neck:  supple, thyroid normal, trachea midline


Respiratory/Chest:  chest tube placed in left posterior back, good air entry, 

no wheezing


Cardiovascular:  regular rate, rhythm


Abdomen/GI:  non tender, soft, no organomegaly


Back:  normal inspection


Extremities/Musculoskelatal:  normal inspection, no calf tenderness, no pedal 

edema


Neurologic/Psych:  no motor/sensory deficits, alert, normal mood/affect, 

oriented x 3


Skin:  normal color, warm/dry


ASSESSMENT & PLAN:


Admission CXR 2/8/18:


The chest has an emphysematous configuration. There is a left-sided 

pneumothorax with maximal pleural separation of 20 mm. There is no overt 

failure. Left basilar airspace opacities are likely atelectatic. There is mild 

blunting of the right lateral costophrenic angle. IMPRESSION: Left-sided 

pneumothorax with pleural separation of 20 mm





LEFT PNEUMOTHORAX. 


-s/p left chest tube placement on 2/10/18 by CT surgery


-Post chest tube CXR 2/10/18: Large bore left pleural drain positioned at the 

left base with significant decrease in left lower lobe atelectasis and left 

pneumothorax


-follow up CT chest 2/11/18: Slight interstitial and peribronchial prominence. 

No well-defined focal infiltrate. 3 mm calcified granuloma right middle lobe -

transaxial image 180. Irregular lesion marginated 6 mm pleural-based nodule 

right lower lobe transaxial image 204. Left-sided chest tube with mild 

localized subcutaneous emphysematous-type change. Several small basilar bleb-

like changes with minimal dependent basilar atelectatic change. Tortuosity and 

ectasia thoracic aorta. Trace pleural fluid right base.


-Appreciate further CT surgery consultation recommendations on management of 

the chest tube





Presence of pulmonary nodules


-outpatient follow up imaging





COPD EXACERBATION


Patient does not require supplemental O2 at home


Titrate supplemental O2 as tolerated to maintain SaO2 between 88 and 92%


Continue with IV steroids


Patient on azithromycin and ceftriaxone, continue for now as patient has chest 

tube which is a foreign body





Hypothyroidism: Continue levothyroxine 





HTN: Continue home dose amlodipine, hctz, lopressor 





Hypokalemia: resolved after potassium repletion on this admission





Tobacco use disorder: Smoking cessation ordered and advised





Diarrhea:


-H/o colon cancer s/p resection


-Cont home dose Prevalite 





Anemia (iron deficiency, b12 deficiency):


-Has a history of MAXIMINO and follows with hematology for IV iron injections


-Cont Vit b12, folate supplementation 


-Monitor CBC s/p chest tube





SCD ppx


Vital Signs:











  Date Time  Temp Pulse Resp B/P (MAP) Pulse Ox O2 Delivery O2 Flow Rate FiO2


 


2/11/18 15:24 36.7 89 20 158/82 (107) 91 Room Air  


 


2/11/18 15:13  81 16  92 Room Air  


 


2/11/18 12:00     96 Room Air  


 


2/11/18 11:09  80 18  97 Nasal Cannula 2.0 


 


2/11/18 11:08 36.4 77 18 151/78 (102) 93  2.0 


 


2/11/18 08:00     98 Nasal Cannula 2.0 


 


2/11/18 07:48 36.9 93 18 172/90 (117) 98   


 


2/11/18 07:41  81 18  98 Nasal Cannula 4.0 


 


2/11/18 05:17 36.3 72 20 148/87 (107) 99 Nasal Cannula 4.0 


 


2/11/18 04:00      Nasal Cannula 2.0 


 


2/11/18 01:18 36.2 72 19 134/75 (94) 99 Nasal Cannula 3.0 


 


2/11/18 00:00      Nasal Cannula 2.0 


 


2/10/18 20:00      Nasal Cannula 2.0 


 


2/10/18 19:29 36.5 103 18 139/79 (99) 95 Nasal Cannula 2.0 


 


2/10/18 19:28  82 18  93 Nasal Cannula 2.0 








Lab Results:





Results Past 24 Hours








Test


  2/11/18


08:00 Range/Units


 


 


White Blood Count 10.09 4.8-10.8  K/uL


 


Red Blood Count 4.33 4.2-5.4  M/uL


 


Hemoglobin 13.6 12.0-16.0  g/dL


 


Hematocrit 39.8 37-47  %


 


Mean Corpuscular Volume 91.9   fL


 


Mean Corpuscular Hemoglobin 31.4 25-34  pg


 


Mean Corpuscular Hemoglobin


Concent 34.2


  32-36  g/dl


 


 


Platelet Count 143 130-400  K/uL


 


Mean Platelet Volume 9.7 7.4-10.4  fL


 


Neutrophils (%) (Auto) 88.9  %


 


Lymphocytes (%) (Auto) 5.6  %


 


Monocytes (%) (Auto) 5.3  %


 


Eosinophils (%) (Auto) 0.0  %


 


Basophils (%) (Auto) 0.0  %


 


Neutrophils # (Auto) 8.98 1.4-6.5  K/uL


 


Lymphocytes # (Auto) 0.56 1.2-3.4  K/uL


 


Monocytes # (Auto) 0.53 0.11-0.59  K/uL


 


Eosinophils # (Auto) 0.00 0-0.5  K/uL


 


Basophils # (Auto) 0.00 0-0.2  K/uL


 


RDW Standard Deviation 44.5 36.4-46.3  fL


 


RDW Coefficient of Variation 13.2 11.5-14.5  %


 


Immature Granulocyte % (Auto) 0.2  %


 


Immature Granulocyte # (Auto) 0.02 0.00-0.02  K/uL


 


Sodium Level 138 136-145  mmol/L


 


Potassium Level 3.8 3.5-5.1  mmol/L


 


Chloride Level 98   mmol/L


 


Carbon Dioxide Level 31 21-32  mmol/L


 


Anion Gap 8.0 3-11  mmol/L


 


Blood Urea Nitrogen 26 7-18  mg/dl


 


Creatinine


  0.69


  0.60-1.20


mg/dl


 


Est Creatinine Clear Calc


Drug Dose 46.4


   ml/min


 


 


Estimated GFR (


American) 93.3


   


 


 


Estimated GFR (Non-


American 80.5


   


 


 


BUN/Creatinine Ratio 37.8 10-20  


 


Random Glucose 100 70-99  mg/dl


 


Calcium Level 9.0 8.5-10.1  mg/dl


 


Total Bilirubin 0.9 0.2-1  mg/dl


 


Aspartate Amino Transf


(AST/SGOT) 35


  15-37  U/L


 


 


Alanine Aminotransferase


(ALT/SGPT) 31


  12-78  U/L


 


 


Alkaline Phosphatase 62   U/L


 


Total Protein 5.9 6.4-8.2  gm/dl


 


Albumin 3.1 3.4-5.0  gm/dl


 


Globulin 2.8 2.5-4.0  gm/dl


 


Albumin/Globulin Ratio 1.1 0.9-2  








Microbiology Results


2/11/18 C.difficile Toxin B Gene (PCR) - Final, Complete


          No C. difficile toxin B gene detected

## 2018-02-12 VITALS
OXYGEN SATURATION: 92 % | HEART RATE: 79 BPM | TEMPERATURE: 98.42 F | SYSTOLIC BLOOD PRESSURE: 174 MMHG | DIASTOLIC BLOOD PRESSURE: 76 MMHG

## 2018-02-12 VITALS
OXYGEN SATURATION: 92 % | HEART RATE: 76 BPM | SYSTOLIC BLOOD PRESSURE: 149 MMHG | DIASTOLIC BLOOD PRESSURE: 72 MMHG | TEMPERATURE: 98.24 F

## 2018-02-12 VITALS — HEART RATE: 88 BPM | OXYGEN SATURATION: 99 %

## 2018-02-12 VITALS
DIASTOLIC BLOOD PRESSURE: 74 MMHG | SYSTOLIC BLOOD PRESSURE: 137 MMHG | HEART RATE: 78 BPM | OXYGEN SATURATION: 94 % | TEMPERATURE: 97.34 F

## 2018-02-12 VITALS
SYSTOLIC BLOOD PRESSURE: 147 MMHG | OXYGEN SATURATION: 92 % | TEMPERATURE: 98.06 F | DIASTOLIC BLOOD PRESSURE: 72 MMHG | HEART RATE: 97 BPM

## 2018-02-12 VITALS — OXYGEN SATURATION: 92 %

## 2018-02-12 VITALS
DIASTOLIC BLOOD PRESSURE: 80 MMHG | TEMPERATURE: 98.24 F | HEART RATE: 85 BPM | OXYGEN SATURATION: 93 % | SYSTOLIC BLOOD PRESSURE: 132 MMHG

## 2018-02-12 VITALS — HEART RATE: 89 BPM | OXYGEN SATURATION: 90 %

## 2018-02-12 VITALS — HEART RATE: 81 BPM | OXYGEN SATURATION: 95 %

## 2018-02-12 VITALS
HEART RATE: 70 BPM | DIASTOLIC BLOOD PRESSURE: 70 MMHG | OXYGEN SATURATION: 92 % | SYSTOLIC BLOOD PRESSURE: 138 MMHG | TEMPERATURE: 98.06 F

## 2018-02-12 VITALS — HEART RATE: 96 BPM | OXYGEN SATURATION: 95 %

## 2018-02-12 VITALS — OXYGEN SATURATION: 95 %

## 2018-02-12 LAB
ALBUMIN SERPL-MCNC: 3 GM/DL (ref 3.4–5)
ALP SERPL-CCNC: 68 U/L (ref 45–117)
ALT SERPL-CCNC: 40 U/L (ref 12–78)
AST SERPL-CCNC: 30 U/L (ref 15–37)
BASOPHILS # BLD: 0 K/UL (ref 0–0.2)
BASOPHILS NFR BLD: 0 %
BUN SERPL-MCNC: 33 MG/DL (ref 7–18)
CALCIUM SERPL-MCNC: 8.6 MG/DL (ref 8.5–10.1)
CO2 SERPL-SCNC: 30 MMOL/L (ref 21–32)
CREAT SERPL-MCNC: 0.81 MG/DL (ref 0.6–1.2)
EOS ABS #: 0 K/UL (ref 0–0.5)
EOSINOPHIL NFR BLD AUTO: 142 K/UL (ref 130–400)
GLUCOSE SERPL-MCNC: 95 MG/DL (ref 70–99)
HCT VFR BLD CALC: 38.6 % (ref 37–47)
HGB BLD-MCNC: 13.3 G/DL (ref 12–16)
IG#: 0.02 K/UL (ref 0–0.02)
IMM GRANULOCYTES NFR BLD AUTO: 5.1 %
LYMPHOCYTES # BLD: 0.4 K/UL (ref 1.2–3.4)
MCH RBC QN AUTO: 31.6 PG (ref 25–34)
MCHC RBC AUTO-ENTMCNC: 34.5 G/DL (ref 32–36)
MCV RBC AUTO: 91.7 FL (ref 80–100)
MONO ABS #: 0.33 K/UL (ref 0.11–0.59)
MONOCYTES NFR BLD: 4.2 %
NEUT ABS #: 7.03 K/UL (ref 1.4–6.5)
NEUTROPHILS # BLD AUTO: 0 %
NEUTROPHILS NFR BLD AUTO: 90.4 %
PMV BLD AUTO: 9.9 FL (ref 7.4–10.4)
POTASSIUM SERPL-SCNC: 4 MMOL/L (ref 3.5–5.1)
PROT SERPL-MCNC: 5.7 GM/DL (ref 6.4–8.2)
RED CELL DISTRIBUTION WIDTH CV: 13.4 % (ref 11.5–14.5)
RED CELL DISTRIBUTION WIDTH SD: 44.7 FL (ref 36.4–46.3)
SODIUM SERPL-SCNC: 137 MMOL/L (ref 136–145)
WBC # BLD AUTO: 7.78 K/UL (ref 4.8–10.8)

## 2018-02-12 PROCEDURE — 0BPQX0Z REMOVAL OF DRAINAGE DEVICE FROM PLEURA, EXTERNAL APPROACH: ICD-10-PCS | Performed by: THORACIC SURGERY (CARDIOTHORACIC VASCULAR SURGERY)

## 2018-02-12 RX ADMIN — Medication SCH INTER.UNIT: at 08:09

## 2018-02-12 RX ADMIN — ALUMINUM ZIRCONIUM TRICHLOROHYDREX GLY SCH EA: 0.2 STICK TOPICAL at 08:00

## 2018-02-12 RX ADMIN — POTASSIUM CHLORIDE SCH MEQ: 1500 TABLET, EXTENDED RELEASE ORAL at 08:10

## 2018-02-12 RX ADMIN — IPRATROPIUM BROMIDE AND ALBUTEROL SULFATE SCH ML: .5; 3 SOLUTION RESPIRATORY (INHALATION) at 16:17

## 2018-02-12 RX ADMIN — IPRATROPIUM BROMIDE AND ALBUTEROL SULFATE SCH ML: .5; 3 SOLUTION RESPIRATORY (INHALATION) at 07:37

## 2018-02-12 RX ADMIN — AZITHROMYCIN SCH MG: 250 TABLET, FILM COATED ORAL at 08:09

## 2018-02-12 RX ADMIN — CHOLESTYRAMINE SCH GM: 4 POWDER, FOR SUSPENSION ORAL at 10:00

## 2018-02-12 RX ADMIN — ALUMINUM ZIRCONIUM TRICHLOROHYDREX GLY SCH EA: 0.2 STICK TOPICAL at 16:00

## 2018-02-12 RX ADMIN — OXYCODONE HYDROCHLORIDE AND ACETAMINOPHEN SCH MG: 500 TABLET ORAL at 08:10

## 2018-02-12 RX ADMIN — IPRATROPIUM BROMIDE AND ALBUTEROL SULFATE SCH ML: .5; 3 SOLUTION RESPIRATORY (INHALATION) at 11:23

## 2018-02-12 RX ADMIN — METHYLPREDNISOLONE SODIUM SUCCINATE SCH MLS/MIN: 1 INJECTION, POWDER, FOR SOLUTION INTRAMUSCULAR; INTRAVENOUS at 14:08

## 2018-02-12 RX ADMIN — Medication SCH MCG: at 08:09

## 2018-02-12 RX ADMIN — ALUMINUM ZIRCONIUM TRICHLOROHYDREX GLY SCH EA: 0.2 STICK TOPICAL at 00:00

## 2018-02-12 RX ADMIN — AMLODIPINE BESYLATE SCH MG: 5 TABLET ORAL at 08:10

## 2018-02-12 RX ADMIN — IPRATROPIUM BROMIDE AND ALBUTEROL SULFATE SCH ML: .5; 3 SOLUTION RESPIRATORY (INHALATION) at 19:17

## 2018-02-12 RX ADMIN — CEFTRIAXONE SODIUM SCH MLS/HR: 1 INJECTION, POWDER, FOR SOLUTION INTRAVENOUS at 14:08

## 2018-02-12 RX ADMIN — HYDROCHLOROTHIAZIDE SCH MG: 25 TABLET ORAL at 08:10

## 2018-02-12 RX ADMIN — LEVOTHYROXINE SODIUM SCH MCG: 88 TABLET ORAL at 05:43

## 2018-02-12 RX ADMIN — METOPROLOL TARTRATE SCH MG: 50 TABLET, FILM COATED ORAL at 08:10

## 2018-02-12 RX ADMIN — METOPROLOL TARTRATE SCH MG: 50 TABLET, FILM COATED ORAL at 21:42

## 2018-02-12 RX ADMIN — METHYLPREDNISOLONE SODIUM SUCCINATE SCH MLS/MIN: 1 INJECTION, POWDER, FOR SOLUTION INTRAMUSCULAR; INTRAVENOUS at 05:43

## 2018-02-12 RX ADMIN — CHOLESTYRAMINE SCH GM: 4 POWDER, FOR SUSPENSION ORAL at 21:42

## 2018-02-12 NOTE — DIAGNOSTIC IMAGING REPORT
CHEST ONE VIEW PORTABLE



CLINICAL HISTORY: 83 years-old Female presenting with tube removal . 



TECHNIQUE: Portable upright AP view of the chest was obtained.



COMPARISON: 2/11/2018.



FINDINGS:

Interval removal of the large bore left pleural drain. Atherosclerosis of aortic

arch. Cardiac silhouette enlarged. Lungs remain hyperinflated. Minimal basilar

opacities. Slight interval increase in the small left apical pneumothorax, with

a pleural separation of 11 mm, previously 2-3 mm. No pleural effusion.

Osteopenia suspected. 



IMPRESSION:

1.  Status post removal of large bore left pleural drain with slight increase

size of the small left apical pneumothorax.



2.  Emphysema.



3.  Bibasilar atelectasis or scarring.







Electronically signed by:  Sai Fraga M.D.

2/12/2018 8:27 AM



Dictated Date/Time:  2/12/2018 8:26 AM

## 2018-02-12 NOTE — SURGERY PROGRESS NOTE
DATE: 02/12/2018

 

SUBJECTIVE:  Ms. Vilchis was seen today.  I reviewed her CT scan.  She has

about an 8 mm nodule in the peripheral right lung periphery.  She is quite

concerned about this when I told her.  She really does not have much of a

pneumothorax at all.  She does have some bullous disease at the apical aspect

of both upper lobes; however, she has no air leak and no fluid drainage.  I

am going to remove her chest tube today.  I had a long talk with her about

our findings and I believe that she is going to be able to be discharged

tomorrow or so if her x-ray looks good and she is good from a rhythm

standpoint.  I will follow her up in the next week or so and we will talk

about CT or perhaps even a PET.

 

 

 

 

KIYA

## 2018-02-12 NOTE — PULMONOLOGY PROGRESS NOTE
Pulmonary Progress Note


Date of Service


Feb 12, 2018.





Attending


Dr. Aguilar





Subjective


Patient doing well sitting up in bed able to have complete sentences with no 

complain of shortness of Breath:





Objective


Patient is able to sit up in bed have complete sentences/conversation with no 

signs of respiratory insufficiency:





Vital signs:


SaO2 on room air 90%


Respiratory:  Decreased breath sounds bilaterally


Abdomen:  Positive bowel sounds soft nontender


Cardiac:  S1-S2 regular rate and rhythm





Assessment & Plan


83-year-old female admitted with spontaneous pneumothorax and found to have 7 

mm right lower lobe solitary nodule:





1. Pneumothorax:  This time the patient has undergone tube thoracotomy with re-

expansion of the lung and clinically is doing well.  I did speak to the patient 

and family at length and re-emphasized the necessity to follow-up with this 

particular process in even the possibility of this recurring requiring more 

advanced surgical intervention such as bullectomy and pleurodesis.





2.  Pulmonary Nodule:  Patient is at high risk for primary lung carcinoma and 

has a 7 mm right lower lobe pulmonary nodule.  Per the Fleischner 2017 

guidelines she will require follow-up at a 6-12 month window and then 18-24 

months window.  Due to this I have asked the patient to come to the [facility] for further evaluation of follow-up on this issue.  At 

that time we should perform pulmonary function testing.





3. COPD:  We do not have previous pulmonary function studies to determine the 

extent of the underlying damage to this patient's lung.  I do suggest she have 

a 2 step prior to discharge to evaluate for oxygen necessity.





Data


Medications:





Current Inpatient Medications








 Medications


  (Trade)  Dose


 Ordered  Sig/Adali


 Route  Start Time


 Stop Time Status Last Admin


Dose Admin


 


 Methylprednisolone


 Sodium Succinate


 40 mg/Syringe  0.64 ml @ 


 1.5 mls/min  Q8H


 IV  2/8/18 22:00


 3/10/18 21:59  2/12/18 14:08


1.5 MLS/MIN


 


 Ceftriaxone


 Sodium 1 gm/


 Dextrose  50 ml @ 


 100 mls/hr  Q24H


 IV  2/9/18 14:00


 2/16/18 13:59  2/12/18 14:08


100 MLS/HR


 


 Albuterol/


 Ipratropium


  (Duoneb)  3 ml  QIDR


 INH  2/8/18 16:00


 3/10/18 15:59  2/12/18 16:17


3 ML


 


 Amlodipine


 Besylate


  (Norvasc Tab)  5 mg  DAILY


 PO  2/9/18 09:00


 3/11/18 08:59  2/12/18 08:10


5 MG


 


 Cyanocobalamin


  (Vitamin B-12


 Tab)  1,000 mcg  DAILY


 PO  2/9/18 09:00


 3/11/18 08:59  2/12/18 08:09


1,000 MCG


 


 Folic Acid


  (Folvite Tab)  1 mg  HS


 PO  2/8/18 21:00


 3/10/18 20:59  2/11/18 20:28


1 MG


 


 Levothyroxine


 Sodium


  (Synthroid Tab)  88 mcg  DAILYBB


 PO  2/9/18 06:00


 3/11/18 06:59  2/12/18 05:43


88 MCG


 


 Metoprolol


 Tartrate


  (Lopressor Tab)  50 mg  BID


 PO  2/8/18 21:00


 3/10/18 20:59  2/12/18 08:10


50 MG


 


 Ascorbic Acid


  (Vitamin C Tab)  500 mg  DAILY


 PO  2/9/18 09:00


 3/11/18 08:59  2/12/18 08:10


500 MG


 


 Cholecalciferol


  (Vitamin D Tab)  2,000


 inter.unit  DAILY


 PO  2/9/18 09:00


 3/11/18 08:59  2/12/18 08:09


2,000 INTER.UNIT


 


 Cholestyramine


 Resin


  (Questran Powder


 Light)  4 gm  BID@1000,2200


 PO  2/8/18 22:00


 3/10/18 21:59  2/11/18 21:54


4 GM


 


 Hydrochlorothiazide


  (Hydrochlorothiazide


 Tab)  12.5 mg  DAILY


 PO  2/9/18 09:00


 3/11/18 08:59  2/12/18 08:10


12.5 MG


 


 Miscellaneous


 Information


  (Order Awaiting


 Action)  1 ea  QS


 N/A  2/9/18 00:00


 3/11/18 00:00   


 


 


 Potassium Chloride


  (Klor-Con Tab)  20 meq  QAM


 PO  2/9/18 09:00


 3/11/18 08:59  2/12/18 08:10


20 MEQ


 


 Oxycodone HCl


  (Roxicodone


 Immediate Rel Tab)  5 mg  Q3RWA  PRN


 PO  2/10/18 11:00


 2/24/18 10:59  2/11/18 21:54


5 MG


 


 Acetaminophen


  (Tylenol Tab)  650 mg  Q4H  PRN


 PO  2/10/18 11:45


 3/12/18 11:44  2/12/18 07:18


650 MG








I & O:











24-Hour Column 


 


 2/13/18





 08:00


 


Intake Total 400 ml


 


Output Total 275 ml


 


Balance 125 ml








Vital Signs:











  Date Time  Temp Pulse Resp B/P (MAP) Pulse Ox O2 Delivery O2 Flow Rate FiO2


 


2/12/18 16:18  89 16  90 Room Air  


 


2/12/18 15:51 36.8 85 16 132/80 (97) 93 Room Air  


 


2/12/18 12:00     95 Room Air  


 


2/12/18 11:23  81 16  95 Room Air  


 


2/12/18 10:11 36.8 76 18 149/72 (97) 92 Room Air  


 


2/12/18 08:00     92 Room Air  


 


2/12/18 07:37  88 16  99 Room Air  


 


2/12/18 07:05 36.9 79 18 174/76 (108) 92 Room Air  


 


2/12/18 04:24      Room Air  


 


2/12/18 03:47 36.7 70 16 138/70 (92) 92 Room Air  


 


2/11/18 23:48 36.6 76 18 143/69 (93) 93 Room Air  


 


2/11/18 20:10 36.6 99 20 120/73 (89) 92 Room Air  


 


2/11/18 19:59      Room Air 2.0 





      Nasal Cannula  


 


2/11/18 19:15  98 16  93 Room Air  








Laboratory Results:





Last 24 Hours








Test


  2/12/18


06:01


 


White Blood Count 7.78 K/uL 


 


Red Blood Count 4.21 M/uL 


 


Hemoglobin 13.3 g/dL 


 


Hematocrit 38.6 % 


 


Mean Corpuscular Volume 91.7 fL 


 


Mean Corpuscular Hemoglobin 31.6 pg 


 


Mean Corpuscular Hemoglobin


Concent 34.5 g/dl 


 


 


Platelet Count 142 K/uL 


 


Mean Platelet Volume 9.9 fL 


 


Neutrophils (%) (Auto) 90.4 % 


 


Lymphocytes (%) (Auto) 5.1 % 


 


Monocytes (%) (Auto) 4.2 % 


 


Eosinophils (%) (Auto) 0.0 % 


 


Basophils (%) (Auto) 0.0 % 


 


Neutrophils # (Auto) 7.03 K/uL 


 


Lymphocytes # (Auto) 0.40 K/uL 


 


Monocytes # (Auto) 0.33 K/uL 


 


Eosinophils # (Auto) 0.00 K/uL 


 


Basophils # (Auto) 0.00 K/uL 


 


RDW Standard Deviation 44.7 fL 


 


RDW Coefficient of Variation 13.4 % 


 


Immature Granulocyte % (Auto) 0.3 % 


 


Immature Granulocyte # (Auto) 0.02 K/uL 


 


Sodium Level 137 mmol/L 


 


Potassium Level 4.0 mmol/L 


 


Chloride Level 101 mmol/L 


 


Carbon Dioxide Level 30 mmol/L 


 


Anion Gap 6.0 mmol/L 


 


Blood Urea Nitrogen 33 mg/dl 


 


Creatinine 0.81 mg/dl 


 


Est Creatinine Clear Calc


Drug Dose 39.5 ml/min 


 


 


Estimated GFR (


American) 77.8 


 


 


Estimated GFR (Non-


American 67.2 


 


 


BUN/Creatinine Ratio 40.7 


 


Random Glucose 95 mg/dl 


 


Calcium Level 8.6 mg/dl 


 


Total Bilirubin 0.7 mg/dl 


 


Aspartate Amino Transf


(AST/SGOT) 30 U/L 


 


 


Alanine Aminotransferase


(ALT/SGPT) 40 U/L 


 


 


Alkaline Phosphatase 68 U/L 


 


Total Protein 5.7 gm/dl 


 


Albumin 3.0 gm/dl 


 


Globulin 2.7 gm/dl 


 


Albumin/Globulin Ratio 1.1

## 2018-02-12 NOTE — DIAGNOSTIC IMAGING REPORT
CHEST 2 VIEWS ROUTINE



HISTORY:      follow up post chest tube imaging



COMPARISON: Chest 2/12/2018.



FINDINGS: No significant change in the small left apical pneumothorax. This

demonstrates a pleural gap of 11 mm. Advanced emphysema. The heart is stable in

size. No new focal lung consolidations. No evidence for pulmonary edema. Stable

blunting of the costophrenic sulci.



IMPRESSION:

Stable small left pneumothorax.







Electronically signed by:  gUo Vu M.D.

2/12/2018 1:57 PM



Dictated Date/Time:  2/12/2018 1:55 PM

## 2018-02-12 NOTE — PROGRESS NOTE
Progress Note


Date of Service


Feb 12, 2018.





Progress Note


chest tube removed without difficulty.  Dressing applied.

## 2018-02-12 NOTE — PROGRESS NOTE
Internal Med Progress Note


Date of Service:


Feb 12, 2018.


Provider Documentation:


Subjective: Patient s/p chest tube removal. Patient breathing on room air and 

speaking with nurse comfortably 





Physical Exam


General Appearance:  no distress


Head:  normocephalic, atraumatic


Eyes:  normal inspection, sclerae normal


ENT:  normal ENT inspection, hearing grossly normal, pharynx normal


Neck:  supple, thyroid normal, trachea midline


Respiratory/Chest: good air entry, no wheezing


Cardiovascular:  regular rate, rhythm


Abdomen/GI:  non tender, soft, no organomegaly


Back:  normal inspection


Extremities/Musculoskelatal:  normal inspection, no calf tenderness, no pedal 

edema


Neurologic/Psych:  no motor/sensory deficits, alert, normal mood/affect, 

oriented x 3


Skin:  normal color, warm/dry


ASSESSMENT & PLAN:


Admission CXR 2/8/18:


The chest has an emphysematous configuration. There is a left-sided 

pneumothorax with maximal pleural separation of 20 mm. There is no overt 

failure. Left basilar airspace opacities are likely atelectatic. There is mild 

blunting of the right lateral costophrenic angle. IMPRESSION: Left-sided 

pneumothorax with pleural separation of 20 mm





LEFT PNEUMOTHORAX. 


-s/p left chest tube placement on 2/10/18 by CT surgery


-Post chest tube CXR 2/10/18: Large bore left pleural drain positioned at the 

left base with significant decrease in left lower lobe atelectasis and left 

pneumothorax


-follow up CT chest 2/11/18: Slight interstitial and peribronchial prominence. 

No well-defined focal infiltrate. 3 mm calcified granuloma right middle lobe -

transaxial image 180. Irregular lesion marginated 6 mm pleural-based nodule 

right lower lobe transaxial image 204. Left-sided chest tube with mild 

localized subcutaneous emphysematous-type change. Several small basilar bleb-

like changes with minimal dependent basilar atelectatic change. Tortuosity and 

ectasia thoracic aorta. Trace pleural fluid right base.


-chest tube removed on 2/12/18 and 2 consecutive CXR of a increase in the small 

left apical pneumothorax, with a pleural separation of 11 mm, previously 2-3 mm

; despite this increase in size patient clinically doing well, will repeat CXR 

on AM of 2/13/18





Presence of pulmonary nodules


-outpatient follow up imaging





COPD EXACERBATION


Patient does not require supplemental O2 at home


Titrate supplemental O2 as tolerated to maintain SaO2 between 88 and 92%


IV steroids and  azithromycin and ceftriaxone to be stopped on 2/12/18





Hypothyroidism: Continue levothyroxine 





HTN: Continue home dose amlodipine, hctz, lopressor 





Hypokalemia: resolved after potassium repletion on this admission





Tobacco use disorder: Smoking cessation ordered and advised





Diarrhea:


-H/o colon cancer s/p resection


-Cont home dose Prevalite 





Anemia (iron deficiency, b12 deficiency):


-Has a history of MAXIMINO and follows with hematology for IV iron injections


-Cont Vit b12, folate supplementation 


-Monitor CBC s/p chest tube





SCD ppx





Disposition: CT surgery had informed patient may go home by tomorrow on 2/13/18 

but will repeat CXR on AM of 2/13/18 to rule out increase in the small left 

apical pneumothorax size


Vital Signs:











  Date Time  Temp Pulse Resp B/P (MAP) Pulse Ox O2 Delivery O2 Flow Rate FiO2


 


2/12/18 19:17  96 16  95 Room Air  


 


2/12/18 16:18  89 16  90 Room Air  


 


2/12/18 15:51 36.8 85 16 132/80 (97) 93 Room Air  


 


2/12/18 12:00     95 Room Air  


 


2/12/18 11:23  81 16  95 Room Air  


 


2/12/18 10:11 36.8 76 18 149/72 (97) 92 Room Air  


 


2/12/18 08:00     92 Room Air  


 


2/12/18 07:37  88 16  99 Room Air  


 


2/12/18 07:05 36.9 79 18 174/76 (108) 92 Room Air  


 


2/12/18 04:24      Room Air  


 


2/12/18 03:47 36.7 70 16 138/70 (92) 92 Room Air  


 


2/11/18 23:48 36.6 76 18 143/69 (93) 93 Room Air  


 


2/11/18 20:10 36.6 99 20 120/73 (89) 92 Room Air  


 


2/11/18 19:59      Room Air 2.0 





      Nasal Cannula  








Lab Results:





Results Past 24 Hours








Test


  2/12/18


06:01 Range/Units


 


 


White Blood Count 7.78 4.8-10.8  K/uL


 


Red Blood Count 4.21 4.2-5.4  M/uL


 


Hemoglobin 13.3 12.0-16.0  g/dL


 


Hematocrit 38.6 37-47  %


 


Mean Corpuscular Volume 91.7   fL


 


Mean Corpuscular Hemoglobin 31.6 25-34  pg


 


Mean Corpuscular Hemoglobin


Concent 34.5


  32-36  g/dl


 


 


Platelet Count 142 130-400  K/uL


 


Mean Platelet Volume 9.9 7.4-10.4  fL


 


Neutrophils (%) (Auto) 90.4  %


 


Lymphocytes (%) (Auto) 5.1  %


 


Monocytes (%) (Auto) 4.2  %


 


Eosinophils (%) (Auto) 0.0  %


 


Basophils (%) (Auto) 0.0  %


 


Neutrophils # (Auto) 7.03 1.4-6.5  K/uL


 


Lymphocytes # (Auto) 0.40 1.2-3.4  K/uL


 


Monocytes # (Auto) 0.33 0.11-0.59  K/uL


 


Eosinophils # (Auto) 0.00 0-0.5  K/uL


 


Basophils # (Auto) 0.00 0-0.2  K/uL


 


RDW Standard Deviation 44.7 36.4-46.3  fL


 


RDW Coefficient of Variation 13.4 11.5-14.5  %


 


Immature Granulocyte % (Auto) 0.3  %


 


Immature Granulocyte # (Auto) 0.02 0.00-0.02  K/uL


 


Sodium Level 137 136-145  mmol/L


 


Potassium Level 4.0 3.5-5.1  mmol/L


 


Chloride Level 101   mmol/L


 


Carbon Dioxide Level 30 21-32  mmol/L


 


Anion Gap 6.0 3-11  mmol/L


 


Blood Urea Nitrogen 33 7-18  mg/dl


 


Creatinine


  0.81


  0.60-1.20


mg/dl


 


Est Creatinine Clear Calc


Drug Dose 39.5


   ml/min


 


 


Estimated GFR (


American) 77.8


   


 


 


Estimated GFR (Non-


American 67.2


   


 


 


BUN/Creatinine Ratio 40.7 10-20  


 


Random Glucose 95 70-99  mg/dl


 


Calcium Level 8.6 8.5-10.1  mg/dl


 


Total Bilirubin 0.7 0.2-1  mg/dl


 


Aspartate Amino Transf


(AST/SGOT) 30


  15-37  U/L


 


 


Alanine Aminotransferase


(ALT/SGPT) 40


  12-78  U/L


 


 


Alkaline Phosphatase 68   U/L


 


Total Protein 5.7 6.4-8.2  gm/dl


 


Albumin 3.0 3.4-5.0  gm/dl


 


Globulin 2.7 2.5-4.0  gm/dl


 


Albumin/Globulin Ratio 1.1 0.9-2

## 2018-02-13 VITALS — HEART RATE: 70 BPM | SYSTOLIC BLOOD PRESSURE: 160 MMHG | DIASTOLIC BLOOD PRESSURE: 81 MMHG | OXYGEN SATURATION: 98 %

## 2018-02-13 VITALS — HEART RATE: 81 BPM | OXYGEN SATURATION: 96 % | SYSTOLIC BLOOD PRESSURE: 130 MMHG | DIASTOLIC BLOOD PRESSURE: 82 MMHG

## 2018-02-13 VITALS — SYSTOLIC BLOOD PRESSURE: 140 MMHG | DIASTOLIC BLOOD PRESSURE: 86 MMHG | OXYGEN SATURATION: 99 % | HEART RATE: 76 BPM

## 2018-02-13 VITALS — OXYGEN SATURATION: 99 % | SYSTOLIC BLOOD PRESSURE: 150 MMHG | DIASTOLIC BLOOD PRESSURE: 81 MMHG | HEART RATE: 76 BPM

## 2018-02-13 VITALS
OXYGEN SATURATION: 90 % | DIASTOLIC BLOOD PRESSURE: 83 MMHG | TEMPERATURE: 97.88 F | HEART RATE: 102 BPM | SYSTOLIC BLOOD PRESSURE: 166 MMHG

## 2018-02-13 VITALS
DIASTOLIC BLOOD PRESSURE: 82 MMHG | HEART RATE: 81 BPM | SYSTOLIC BLOOD PRESSURE: 135 MMHG | OXYGEN SATURATION: 93 % | TEMPERATURE: 97.88 F

## 2018-02-13 VITALS — OXYGEN SATURATION: 95 %

## 2018-02-13 VITALS — OXYGEN SATURATION: 97 % | HEART RATE: 96 BPM

## 2018-02-13 VITALS
HEART RATE: 82 BPM | OXYGEN SATURATION: 95 % | TEMPERATURE: 98.06 F | DIASTOLIC BLOOD PRESSURE: 81 MMHG | SYSTOLIC BLOOD PRESSURE: 154 MMHG

## 2018-02-13 VITALS
SYSTOLIC BLOOD PRESSURE: 185 MMHG | TEMPERATURE: 98.24 F | OXYGEN SATURATION: 96 % | HEART RATE: 82 BPM | DIASTOLIC BLOOD PRESSURE: 94 MMHG

## 2018-02-13 VITALS — SYSTOLIC BLOOD PRESSURE: 150 MMHG | OXYGEN SATURATION: 97 % | HEART RATE: 76 BPM | DIASTOLIC BLOOD PRESSURE: 85 MMHG

## 2018-02-13 VITALS
OXYGEN SATURATION: 95 % | SYSTOLIC BLOOD PRESSURE: 154 MMHG | HEART RATE: 82 BPM | DIASTOLIC BLOOD PRESSURE: 89 MMHG | TEMPERATURE: 98.06 F

## 2018-02-13 VITALS — HEART RATE: 80 BPM | OXYGEN SATURATION: 94 %

## 2018-02-13 VITALS
DIASTOLIC BLOOD PRESSURE: 88 MMHG | TEMPERATURE: 96.44 F | SYSTOLIC BLOOD PRESSURE: 147 MMHG | HEART RATE: 71 BPM | OXYGEN SATURATION: 99 %

## 2018-02-13 VITALS — OXYGEN SATURATION: 90 %

## 2018-02-13 VITALS — HEART RATE: 96 BPM | OXYGEN SATURATION: 97 %

## 2018-02-13 PROCEDURE — 0BBJ4ZX EXCISION OF LEFT LOWER LUNG LOBE, PERCUTANEOUS ENDOSCOPIC APPROACH, DIAGNOSTIC: ICD-10-PCS | Performed by: THORACIC SURGERY (CARDIOTHORACIC VASCULAR SURGERY)

## 2018-02-13 PROCEDURE — 0BBG4ZX EXCISION OF LEFT UPPER LUNG LOBE, PERCUTANEOUS ENDOSCOPIC APPROACH, DIAGNOSTIC: ICD-10-PCS | Performed by: THORACIC SURGERY (CARDIOTHORACIC VASCULAR SURGERY)

## 2018-02-13 PROCEDURE — 0W9B30Z DRAINAGE OF LEFT PLEURAL CAVITY WITH DRAINAGE DEVICE, PERCUTANEOUS APPROACH: ICD-10-PCS | Performed by: THORACIC SURGERY (CARDIOTHORACIC VASCULAR SURGERY)

## 2018-02-13 PROCEDURE — 0BBP4ZX EXCISION OF LEFT PLEURA, PERCUTANEOUS ENDOSCOPIC APPROACH, DIAGNOSTIC: ICD-10-PCS | Performed by: THORACIC SURGERY (CARDIOTHORACIC VASCULAR SURGERY)

## 2018-02-13 RX ADMIN — ALUMINUM ZIRCONIUM TRICHLOROHYDREX GLY SCH EA: 0.2 STICK TOPICAL at 15:45

## 2018-02-13 RX ADMIN — Medication SCH MCG: at 08:08

## 2018-02-13 RX ADMIN — IPRATROPIUM BROMIDE AND ALBUTEROL SULFATE SCH ML: .5; 3 SOLUTION RESPIRATORY (INHALATION) at 19:37

## 2018-02-13 RX ADMIN — METOCLOPRAMIDE SCH MG: 5 INJECTION, SOLUTION INTRAMUSCULAR; INTRAVENOUS at 22:18

## 2018-02-13 RX ADMIN — KETOROLAC TROMETHAMINE SCH MG: 15 INJECTION INTRAMUSCULAR; INTRAVENOUS at 20:00

## 2018-02-13 RX ADMIN — CHOLESTYRAMINE SCH GM: 4 POWDER, FOR SUSPENSION ORAL at 10:00

## 2018-02-13 RX ADMIN — OXYCODONE HYDROCHLORIDE AND ACETAMINOPHEN SCH MG: 500 TABLET ORAL at 08:07

## 2018-02-13 RX ADMIN — DOCUSATE SODIUM SCH MG: 100 CAPSULE, LIQUID FILLED ORAL at 22:17

## 2018-02-13 RX ADMIN — IPRATROPIUM BROMIDE AND ALBUTEROL SULFATE SCH ML: .5; 3 SOLUTION RESPIRATORY (INHALATION) at 11:55

## 2018-02-13 RX ADMIN — HYDROCHLOROTHIAZIDE SCH MG: 25 TABLET ORAL at 08:08

## 2018-02-13 RX ADMIN — METOPROLOL TARTRATE SCH MG: 50 TABLET, FILM COATED ORAL at 08:07

## 2018-02-13 RX ADMIN — ACETAMINOPHEN SCH MLS/HR: 10 INJECTION, SOLUTION INTRAVENOUS at 20:00

## 2018-02-13 RX ADMIN — CHOLESTYRAMINE SCH GM: 4 POWDER, FOR SUSPENSION ORAL at 22:00

## 2018-02-13 RX ADMIN — POTASSIUM CHLORIDE SCH MEQ: 1500 TABLET, EXTENDED RELEASE ORAL at 08:07

## 2018-02-13 RX ADMIN — LEVOTHYROXINE SODIUM SCH MCG: 88 TABLET ORAL at 06:01

## 2018-02-13 RX ADMIN — ALUMINUM ZIRCONIUM TRICHLOROHYDREX GLY SCH EA: 0.2 STICK TOPICAL at 08:00

## 2018-02-13 RX ADMIN — ALUMINUM ZIRCONIUM TRICHLOROHYDREX GLY SCH EA: 0.2 STICK TOPICAL at 00:00

## 2018-02-13 RX ADMIN — IPRATROPIUM BROMIDE AND ALBUTEROL SULFATE SCH ML: .5; 3 SOLUTION RESPIRATORY (INHALATION) at 16:09

## 2018-02-13 RX ADMIN — Medication SCH INTER.UNIT: at 08:07

## 2018-02-13 RX ADMIN — AMLODIPINE BESYLATE SCH MG: 5 TABLET ORAL at 08:07

## 2018-02-13 RX ADMIN — IPRATROPIUM BROMIDE AND ALBUTEROL SULFATE SCH ML: .5; 3 SOLUTION RESPIRATORY (INHALATION) at 07:28

## 2018-02-13 RX ADMIN — METOPROLOL TARTRATE SCH MG: 50 TABLET, FILM COATED ORAL at 22:18

## 2018-02-13 NOTE — OPERATIVE REPORT
DATE OF OPERATION:  02/13/2018

 

PREOPERATIVE DIAGNOSIS:  Recurrent left pneumothorax with apical blebs.

 

POSTOPERATIVE DIAGNOSIS:  Same.

 

PROCEDURE:

1.  Left thoracoscopy with apical bleb resection and resection of superior

segment of the lower lobe.

2.  Partial parietal pleurectomy of the upper chest cavity.

 

SURGEON:  Chase Thakur MD.

 

ASSISTANT:  LUCAS Moe, (MrJennifer Joseph was present for the entire

case and was instrumental in holding the camera and being a first assistant).

 

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

cigarette smoker.  She has been smoking for more than 60 years.  She noted

acute onset of chest pain and had a pneumothorax.  It was not very large, so

I elected to admit her and watch her on oxygen therapy.  Unfortunately, it

increased in size, so I placed a small bore chest tube and it resolved.  She

had no air leak for 48 hours and I removed the chest tube.  Initially, her

chest x-ray looked quite good.  We were prepared to send her home this

morning, however, when I saw her chest x-ray from this morning we could see

that it has recurred.  I repeated it and I felt that may be a little larger

at the base.  I had a long discussion with the patient and her children and

we elected to proceed with an apical bleb resection as the CT scan had showed

some blebs.

 

On 02/13/2018, the patient underwent uncomplicated left thoracoscopy.  She

did have some adhesions which we took down with a Harmonic scalpel.  I

performed an Exparel block.  I excised the apical blebs with an Endo-STACI

stapler as well as the blebs in the superior segment of the lower lobe.  She

really did not have an air leak at the conclusion of the case.  We left a

20-Ukrainian chest tube.  She tolerated it well.

 

DESCRIPTION OF THE PROCEDURE:  The patient brought to the operating room,

laid in supine position.  General anesthesia induced and endotracheal

intubation was performed with a single lumen tube.  The patient was placed in

right lateral decubitus position.  Left chest prepped, draped in usual

sterile fashion.  A 5 mm incision was made about the eighth interspace

posterior axillary line.  The CO2 was then insufflated and with the 5 mm

scope, saw there were some adhesions laterally, but none in the area of our

incisions and the lung had collapsed nicely with the CO2.  We then placed a

12 mm port anteriorly at about the eighth interspace and another 5 mm port at

about the fourth interspace.  Using a Harmonic scalpel, I then took down

these adhesions.  There were blebs in the superior segment of the lower lobe

which I removed with Endo-STACI stapler.

 

There was also blebs apically and there were adhesions as I took down and I

wedged these out also.  We then inflated the lung and really saw no air

leaking.

 

Then, 266 mg of Exparel mixed with 30 mL of 0.5% bupivacaine and 100 mL of

normal saline injected in the intercostal block from the 2nd to the 11th rib.

 

I then used the same solution to inject the 3 port sites.  A 20-Ukrainian chest

tube was inserted towards the apex and brought out through the anterior

inferior wound.  This was sutured in place with heavy silk suture.  A 4-0

Monocryl was used in a running subcuticular fashion to approximate all the

wound edges.  The patient tolerated it well and was extubated in the room.

 

 

I attest to the content of the Intraoperative Record and any orders documented therein. Any exception
s are noted below.

## 2018-02-13 NOTE — ANESTHESIOLOGY PROGRESS NOTE
Anesthesia Post Op Note


Date & Time


Feb 13, 2018 at 20:46





Vital Signs


Pain Intensity:  0





Vital Signs Past 12 Hours








  Date Time  Temp Pulse Resp B/P (MAP) Pulse Ox O2 Delivery O2 Flow Rate FiO2


 


2/13/18 20:35  72 19 134/79 97 Nasal Cannula 4 


 


2/13/18 20:25 36.2 74 17 149/82 98 Nasal Cannula 4 


 


2/13/18 20:15  83 16 150/89 97 Nasal Cannula 4 


 


2/13/18 20:05  81 24 142/84 99 Oxymask 10 


 


2/13/18 19:55  85 19 135/82 99 Oxymask 10 


 


2/13/18 19:47 36.0 84 18 110/76 97 Oxymask 10 


 


2/13/18 17:39 36.4 94 18 141/83 (102) 92 Nasal Cannula 3 


 


2/13/18 16:12  80 16  94 Nasal Cannula 2.0 


 


2/13/18 15:48 36.7 82 20 154/89 (110) 95   


 


2/13/18 15:45     95 Room Air  


 


2/13/18 12:21     95 Nasal Cannula 2.0 


 


2/13/18 12:00      Nasal Cannula 3.0 


 


2/13/18 11:59 36.7 82 16 154/81 (105) 95 Nasal Cannula 2.0 


 


2/13/18 11:55  96 16  97 Nasal Cannula 2.0 


 


2/13/18 09:35 36.8 82 20 185/94 (124) 96 Nasal Cannula 2.0 


 


2/13/18 08:59     95 Nasal Cannula 2.0 











Notes


Mental Status:  alert / awake / arousable, participated in evaluation


Pt Amnestic to Procedure:  Yes


Nausea / Vomiting:  adequately controlled


Pain:  adequately controlled


Airway Patency, RR, SpO2:  stable & adequate


BP & HR:  stable & adequate


Hydration State:  stable & adequate


Anesthetic Complications:  no major complications apparent

## 2018-02-13 NOTE — PROGRESS NOTE
Subjective


Date of Service:


Feb 13, 2018.


Subjective


Pt evaluation today including:  conversation w/ patient, physical exam, lab 

review, review of studies, review of inpatient medication list


Saw/examined the patient in room 285


She is sitting comfortably on the side of the bed.


States she was getting worsening shortness of breath with exertion.


Oxygen reapplied via nasal cannula; she has no breathing difficulties while 

seated.


Discussed chest x-ray results from this morning and the worsening pneumothorax 

- she is agreeable to further treatment plans/management





Problem List


Medical Problems:


(1) Pneumothorax


Status: Acute  





(2) Respiratory failure with hypoxia


Status: Acute  





(3) SOB (shortness of breath)


Status: Acute  











Review of Systems


Constitutional:  No fever, No chills, No weakness


Respiratory:  + dyspnea on exertion, No cough, No sputum, No wheezing, No 

shortness of breath, No dyspnea at rest, No hemoptysis


Cardiac:  + chest pain (pleuritic), No edema, No palpitations





Medications





Current Inpatient Medications








 Medications


  (Trade)  Dose


 Ordered  Sig/Adali


 Route  Start Time


 Stop Time Status Last Admin


Dose Admin


 


 Albuterol/


 Ipratropium


  (Duoneb)  3 ml  QIDR


 INH  2/8/18 16:00


 3/10/18 15:59  2/13/18 07:28


3 ML


 


 Amlodipine


 Besylate


  (Norvasc Tab)  5 mg  DAILY


 PO  2/9/18 09:00


 3/11/18 08:59  2/13/18 08:07


5 MG


 


 Cyanocobalamin


  (Vitamin B-12


 Tab)  1,000 mcg  DAILY


 PO  2/9/18 09:00


 3/11/18 08:59  2/13/18 08:08


1,000 MCG


 


 Folic Acid


  (Folvite Tab)  1 mg  HS


 PO  2/8/18 21:00


 3/10/18 20:59  2/12/18 21:41


1 MG


 


 Levothyroxine


 Sodium


  (Synthroid Tab)  88 mcg  DAILYBB


 PO  2/9/18 06:00


 3/11/18 06:59  2/13/18 06:01


88 MCG


 


 Metoprolol


 Tartrate


  (Lopressor Tab)  50 mg  BID


 PO  2/8/18 21:00


 3/10/18 20:59  2/13/18 08:07


50 MG


 


 Ascorbic Acid


  (Vitamin C Tab)  500 mg  DAILY


 PO  2/9/18 09:00


 3/11/18 08:59  2/13/18 08:07


500 MG


 


 Cholecalciferol


  (Vitamin D Tab)  2,000


 inter.unit  DAILY


 PO  2/9/18 09:00


 3/11/18 08:59  2/13/18 08:07


2,000 INTER.UNIT


 


 Cholestyramine


 Resin


  (Questran Powder


 Light)  4 gm  BID@1000,2200


 PO  2/8/18 22:00


 3/10/18 21:59  2/12/18 21:42


4 GM


 


 Hydrochlorothiazide


  (Hydrochlorothiazide


 Tab)  12.5 mg  DAILY


 PO  2/9/18 09:00


 3/11/18 08:59  2/13/18 08:08


12.5 MG


 


 Miscellaneous


 Information


  (Order Awaiting


 Action)  1 ea  QS


 N/A  2/9/18 00:00


 3/11/18 00:00   


 


 


 Potassium Chloride


  (Klor-Con Tab)  20 meq  QAM


 PO  2/9/18 09:00


 3/11/18 08:59  2/13/18 08:07


20 MEQ


 


 Oxycodone HCl


  (Roxicodone


 Immediate Rel Tab)  5 mg  Q3RWA  PRN


 PO  2/10/18 11:00


 2/24/18 10:59  2/11/18 21:54


5 MG


 


 Acetaminophen


  (Tylenol Tab)  650 mg  Q4H  PRN


 PO  2/10/18 11:45


 3/12/18 11:44  2/12/18 07:18


650 MG











Objective


Vital Signs











  Date Time  Temp Pulse Resp B/P (MAP) Pulse Ox O2 Delivery O2 Flow Rate FiO2


 


2/13/18 09:35 36.8 82 20 185/94 (124) 96 Nasal Cannula 2.0 


 


2/13/18 08:59     95 Nasal Cannula 2.0 


 


2/13/18 08:05 36.6 102 20 166/83 (110) 90 Room Air  


 


2/13/18 08:00      Room Air  


 


2/13/18 08:00     90 Room Air  


 


2/13/18 07:28  96 16  97 Room Air  


 


2/13/18 04:00      Room Air  


 


2/13/18 03:54 36.6 81 18 135/82 (99) 93 Room Air  


 


2/13/18 00:00      Room Air  


 


2/12/18 23:49 36.3 78 16 137/74 (95) 94 Room Air  


 


2/12/18 20:00      Room Air  


 


2/12/18 20:00 36.7 97 16 147/72 (97) 92 Room Air  


 


2/12/18 19:17  96 16  95 Room Air  


 


2/12/18 16:18  89 16  90 Room Air  


 


2/12/18 16:00      Room Air  


 


2/12/18 15:51 36.8 85 16 132/80 (97) 93 Room Air  


 


2/12/18 12:00     95 Room Air  


 


2/12/18 11:23  81 16  95 Room Air  











Physical Exam


General Appearance:  no apparent distress, + cachetic, + thin


ENT:  hearing grossly normal


Respiratory/Chest:  no respiratory distress, no accessory muscle use, + 

decreased breath sounds


Cardiovascular:  regular rate, rhythm, no edema, no murmur


Extremities:  normal inspection, no pedal edema


Neurologic/Psychiatric:  no motor/sensory deficits, alert, normal mood/affect





Assessment and Plan


This is an 83 year old female with a PMH of COPD, tobacco use disorder, iron 

deficiency anemia, B12 deficiency, hypothyroidism, HTN - presents with 

worsening shortness of breath, dyspnea on exertion and pleuritic chest pain and 

subsequently found to have a pneumothorax





Secondary Spontaneous Pneumothorax


presented with a L sided pneumothorax, secondary to her COPD; about 20mm 

separation


She initially wanted conservative management approach to this; but due to 

worsening pneumothorax, she had a chest tube thoracostomy on 2/10


appreciate cardiothoracic surgery management - chest tube was then removed on 2/ 12


Unfortunately, the size of the pneumothorax worsened; on 2/13; CXR performed 

showing a 2.1 cm pleural separation


Plan for now is for possible talc pleurodesis; awaiting further input by 

cardiothoracic surgery





COPD/Tobacco use disorder


patient does not want to use nicotine patch at this time


does not use inhalers at home for maintenance


should have PFTs performed and possible addition of Spiriva vs. ICS/LABA


appreciate pulmonary input for further management


has completed course of steroids and antibiotics which are no longer needed at 

this time





Pulmonary Cachexia


appreciate nutritional consult; patient refusing boost or ensure at this time


she is agreeable to small snacks throughout the day which should help





Pulmonary Nodule


6-7 mm spiculated nodule noted on the R lower lobe


outpatient follow-up in 6 months with repeat chest CT





HTN


blood pressure slightly elevated this morning, though anxiety playing a part 

this morning


will monitor throughout the day


currently on Amlodipine 5mg, low dose HCTZ, and metoprolol for now





Iron Deficiency Anemia


Hgb is stable


has received Venofer in the past


outpatient hematology follow-up





Hypothyroidism


continue Synthroid





Hx. of Colon Cancer


chronic diarrhea; continue Questran





DVT ppx


SCDs





FULL CODE

## 2018-02-13 NOTE — DIAGNOSTIC IMAGING REPORT
CHEST ONE VIEW PORTABLE



CLINICAL HISTORY: 83 years-old Female presenting with bleb resection . 



TECHNIQUE: Portable upright AP view of the chest was obtained.



COMPARISON: 2/13/2018.



FINDINGS:

Atherosclerosis of aortic arch. Cardiac silhouette enlarged. Lungs

hyperinflated.

Left basilar opacity. A large bore left pleural drain has been placed.

Postsurgical changes of the left apex. Left pneumothorax has a pleural

separation of 25 mm. Degenerative changes of the thoracic spine. Subcutaneous

emphysema noted along the left lateral chest wall associated with the pleural

drain.



IMPRESSION:

1.  Small left apical pneumothorax with a large bore pleural drain in place.



2.  Left basilar atelectasis.



3.  Postsurgical changes of the left apex.







Electronically signed by:  Sai Fraga M.D.

2/13/2018 7:56 PM



Dictated Date/Time:  2/13/2018 7:53 PM

## 2018-02-13 NOTE — DIAGNOSTIC IMAGING REPORT
CHEST 2 VIEWS ROUTINE



CLINICAL HISTORY: follow up post chest tube imaging dyspnea



COMPARISON STUDY:  2/12/2018



FINDINGS: Moderate increase in volume of the left sided pneumothorax. Maximum

pleural separation currently is 2.1 cm increased from 1.0 cm. Interstitial

basilar changes slightly progressive on the left. Emphysematous change is

stable. There is no midline shift of the cardiomediastinal silhouette. 



IMPRESSION:  Mild increase in size of a left-sided pneumothorax now with a

pleural separation of 2.1 cm. Stable to minimally progressive left lateral

hemithoracic subcutaneous emphysematous change 











The above report was generated using voice recognition software.  It may contain

grammatical, syntax or spelling errors.









Electronically signed by:  Shahriar Gray M.D.

2/13/2018 8:17 AM



Dictated Date/Time:  2/13/2018 8:10 AM

## 2018-02-13 NOTE — DIAGNOSTIC IMAGING REPORT
CHEST ONE VIEW PORTABLE



CLINICAL HISTORY: pneumothorax pneumothorax



COMPARISON STUDY:  2/13/2018 8:21 AM



FINDINGS: Left apical pneumothorax similar compared to the prior study. Maximum

pleural separation is 2 cm. Area of interval development of a left basilar

pneumothorax having a maximum separation of 1.6 cm.



[place] right base felt to be overlap artifact.



Diffuse emphysematous changes similar. 



IMPRESSION:  Slight increase in volume of a left-sided pneumothorax with the

increased as compared to the prior study primarily at the left base. 











The above report was generated using voice recognition software.  It may contain

grammatical, syntax or spelling errors.









Electronically signed by:  Shahriar Gray M.D.

2/13/2018 11:33 AM



Dictated Date/Time:  2/13/2018 11:32 AM

## 2018-02-13 NOTE — HISTORY & PHYSICAL BRIDGE NOTE
H&P Re-Evaluation


Bridge Note:


I have examined the patient, reviewed the History & Physical and in the 

interval since the performance of the History & Physical I have noted the 

following changes of clinical significance: Mrs. Vilchis has a recurrent 

pneumothorax. Long discussion with the family and patient. Rather than place 

another chest tube, I have recommended we do a thoracoscopy with an apical bleb 

resection and possible pleurectomy. They are agreeable. Long discussion about 

expected benefits and possible complications. They understand.No changes noted

## 2018-02-13 NOTE — [facility] POST OPERATIVE BRIEF NOTE
Immediate Operative Summary


Operative Date


Feb 13, 2018.





Pre-Operative Diagnosis





Left pneumothorax, and apical bleb





Post-Operative Diagnosis





Same





Procedure(s) Performed





Left thoracoscopy with bleb resection, limited pleurectomy





Surgeon


Dr Thakur





Assistant Surgeon(s)


RENÉ Joseph PA-C





Estimated Blood Loss


5 cc's





Findings


Consistent with Post-Op Diagnosis





Specimens





a. Superior segment of left lower lobe





b. [place] Left upper lobe





c. parietal pleural left





Drains


20 fr chest tube





Anesthesia Type


General





Complication(s)


none

## 2018-02-14 VITALS
OXYGEN SATURATION: 95 % | DIASTOLIC BLOOD PRESSURE: 74 MMHG | HEART RATE: 78 BPM | TEMPERATURE: 97.52 F | SYSTOLIC BLOOD PRESSURE: 109 MMHG

## 2018-02-14 VITALS — DIASTOLIC BLOOD PRESSURE: 76 MMHG | HEART RATE: 67 BPM | OXYGEN SATURATION: 98 % | SYSTOLIC BLOOD PRESSURE: 132 MMHG

## 2018-02-14 VITALS
DIASTOLIC BLOOD PRESSURE: 74 MMHG | TEMPERATURE: 97.7 F | HEART RATE: 74 BPM | SYSTOLIC BLOOD PRESSURE: 127 MMHG | OXYGEN SATURATION: 97 %

## 2018-02-14 VITALS
SYSTOLIC BLOOD PRESSURE: 120 MMHG | DIASTOLIC BLOOD PRESSURE: 69 MMHG | HEART RATE: 79 BPM | OXYGEN SATURATION: 95 % | TEMPERATURE: 98.6 F

## 2018-02-14 VITALS
OXYGEN SATURATION: 96 % | DIASTOLIC BLOOD PRESSURE: 84 MMHG | TEMPERATURE: 97.52 F | SYSTOLIC BLOOD PRESSURE: 141 MMHG | HEART RATE: 79 BPM

## 2018-02-14 VITALS — OXYGEN SATURATION: 97 % | DIASTOLIC BLOOD PRESSURE: 77 MMHG | HEART RATE: 66 BPM | SYSTOLIC BLOOD PRESSURE: 148 MMHG

## 2018-02-14 VITALS — HEART RATE: 73 BPM | OXYGEN SATURATION: 97 %

## 2018-02-14 VITALS — OXYGEN SATURATION: 97 % | HEART RATE: 61 BPM | DIASTOLIC BLOOD PRESSURE: 76 MMHG | SYSTOLIC BLOOD PRESSURE: 148 MMHG

## 2018-02-14 VITALS
OXYGEN SATURATION: 98 % | DIASTOLIC BLOOD PRESSURE: 78 MMHG | HEART RATE: 66 BPM | TEMPERATURE: 98.24 F | SYSTOLIC BLOOD PRESSURE: 139 MMHG

## 2018-02-14 VITALS — DIASTOLIC BLOOD PRESSURE: 66 MMHG | HEART RATE: 69 BPM | SYSTOLIC BLOOD PRESSURE: 133 MMHG | OXYGEN SATURATION: 96 %

## 2018-02-14 VITALS
OXYGEN SATURATION: 96 % | TEMPERATURE: 97.88 F | SYSTOLIC BLOOD PRESSURE: 131 MMHG | DIASTOLIC BLOOD PRESSURE: 70 MMHG | HEART RATE: 73 BPM

## 2018-02-14 VITALS
TEMPERATURE: 97.88 F | HEART RATE: 65 BPM | OXYGEN SATURATION: 98 % | DIASTOLIC BLOOD PRESSURE: 77 MMHG | SYSTOLIC BLOOD PRESSURE: 147 MMHG

## 2018-02-14 VITALS — SYSTOLIC BLOOD PRESSURE: 148 MMHG | DIASTOLIC BLOOD PRESSURE: 77 MMHG

## 2018-02-14 VITALS
SYSTOLIC BLOOD PRESSURE: 144 MMHG | HEART RATE: 71 BPM | DIASTOLIC BLOOD PRESSURE: 78 MMHG | OXYGEN SATURATION: 97 % | TEMPERATURE: 98.06 F

## 2018-02-14 VITALS — HEART RATE: 68 BPM | OXYGEN SATURATION: 97 %

## 2018-02-14 VITALS — HEART RATE: 74 BPM | OXYGEN SATURATION: 97 %

## 2018-02-14 VITALS — HEART RATE: 78 BPM | OXYGEN SATURATION: 98 %

## 2018-02-14 RX ADMIN — KETOROLAC TROMETHAMINE SCH MG: 15 INJECTION INTRAMUSCULAR; INTRAVENOUS at 20:17

## 2018-02-14 RX ADMIN — DOCUSATE SODIUM SCH MG: 100 CAPSULE, LIQUID FILLED ORAL at 20:09

## 2018-02-14 RX ADMIN — ALUMINUM ZIRCONIUM TRICHLOROHYDREX GLY SCH EA: 0.2 STICK TOPICAL at 00:00

## 2018-02-14 RX ADMIN — LEVOTHYROXINE SODIUM SCH MCG: 88 TABLET ORAL at 05:54

## 2018-02-14 RX ADMIN — HYDROCHLOROTHIAZIDE SCH MG: 25 TABLET ORAL at 09:00

## 2018-02-14 RX ADMIN — DOCUSATE SODIUM SCH MG: 100 CAPSULE, LIQUID FILLED ORAL at 09:28

## 2018-02-14 RX ADMIN — ACETAMINOPHEN SCH MLS/HR: 10 INJECTION, SOLUTION INTRAVENOUS at 20:08

## 2018-02-14 RX ADMIN — KETOROLAC TROMETHAMINE SCH MG: 15 INJECTION INTRAMUSCULAR; INTRAVENOUS at 04:00

## 2018-02-14 RX ADMIN — IPRATROPIUM BROMIDE AND ALBUTEROL SULFATE SCH ML: .5; 3 SOLUTION RESPIRATORY (INHALATION) at 20:05

## 2018-02-14 RX ADMIN — IPRATROPIUM BROMIDE AND ALBUTEROL SULFATE SCH ML: .5; 3 SOLUTION RESPIRATORY (INHALATION) at 14:40

## 2018-02-14 RX ADMIN — METOPROLOL TARTRATE SCH MG: 50 TABLET, FILM COATED ORAL at 20:10

## 2018-02-14 RX ADMIN — IPRATROPIUM BROMIDE AND ALBUTEROL SULFATE SCH ML: .5; 3 SOLUTION RESPIRATORY (INHALATION) at 06:58

## 2018-02-14 RX ADMIN — ALUMINUM ZIRCONIUM TRICHLOROHYDREX GLY SCH EA: 0.2 STICK TOPICAL at 07:04

## 2018-02-14 RX ADMIN — ACETAMINOPHEN SCH MLS/HR: 10 INJECTION, SOLUTION INTRAVENOUS at 13:48

## 2018-02-14 RX ADMIN — ALUMINUM ZIRCONIUM TRICHLOROHYDREX GLY SCH EA: 0.2 STICK TOPICAL at 14:43

## 2018-02-14 RX ADMIN — CHOLESTYRAMINE SCH GM: 4 POWDER, FOR SUSPENSION ORAL at 10:32

## 2018-02-14 RX ADMIN — KETOROLAC TROMETHAMINE SCH MG: 15 INJECTION INTRAMUSCULAR; INTRAVENOUS at 14:01

## 2018-02-14 RX ADMIN — OXYCODONE HYDROCHLORIDE AND ACETAMINOPHEN SCH MG: 500 TABLET ORAL at 09:24

## 2018-02-14 RX ADMIN — METOCLOPRAMIDE SCH MG: 5 INJECTION, SOLUTION INTRAMUSCULAR; INTRAVENOUS at 05:57

## 2018-02-14 RX ADMIN — METOCLOPRAMIDE SCH MG: 5 INJECTION, SOLUTION INTRAMUSCULAR; INTRAVENOUS at 13:48

## 2018-02-14 RX ADMIN — IPRATROPIUM BROMIDE AND ALBUTEROL SULFATE SCH ML: .5; 3 SOLUTION RESPIRATORY (INHALATION) at 11:32

## 2018-02-14 RX ADMIN — AMLODIPINE BESYLATE SCH MG: 5 TABLET ORAL at 09:26

## 2018-02-14 RX ADMIN — ALUMINUM ZIRCONIUM TRICHLOROHYDREX GLY SCH EA: 0.2 STICK TOPICAL at 22:51

## 2018-02-14 RX ADMIN — Medication SCH MCG: at 09:27

## 2018-02-14 RX ADMIN — METOPROLOL TARTRATE SCH MG: 50 TABLET, FILM COATED ORAL at 09:28

## 2018-02-14 RX ADMIN — ACETAMINOPHEN SCH MLS/HR: 10 INJECTION, SOLUTION INTRAVENOUS at 04:09

## 2018-02-14 RX ADMIN — POTASSIUM CHLORIDE SCH MEQ: 1500 TABLET, EXTENDED RELEASE ORAL at 09:24

## 2018-02-14 RX ADMIN — Medication SCH INTER.UNIT: at 09:25

## 2018-02-14 RX ADMIN — CHOLESTYRAMINE SCH GM: 4 POWDER, FOR SUSPENSION ORAL at 23:02

## 2018-02-14 NOTE — PROGRESS NOTE
Subjective


Date of Service:


Feb 14, 2018.


Subjective


Pt evaluation today including:  conversation w/ patient, physical exam, lab 

review, review of studies, review of inpatient medication list


Saw/examined the patient in room 233


She had L thoracoscopy with bleb resection done yesterday (2/13)


States she feels something is stuck in her throat today; states she took her 

pills and feels that may be stuck there


Denies shortness of breath or chest pain.





Problem List


Medical Problems:


(1) Pneumothorax


Status: Acute  





(2) Respiratory failure with hypoxia


Status: Acute  





(3) SOB (shortness of breath)


Status: Acute  











Review of Systems


Constitutional:  No fever, No chills


ENT:  + trouble swallowing


Respiratory:  No cough, No sputum, No shortness of breath


Cardiac:  No chest pain





Medications





Current Inpatient Medications








 Medications


  (Trade)  Dose


 Ordered  Sig/Adali


 Route  Start Time


 Stop Time Status Last Admin


Dose Admin


 


 Albuterol/


 Ipratropium


  (Duoneb)  3 ml  QIDR


 INH  2/8/18 16:00


 3/10/18 15:59  2/14/18 11:32


3 ML


 


 Amlodipine


 Besylate


  (Norvasc Tab)  5 mg  DAILY


 PO  2/9/18 09:00


 3/11/18 08:59  2/14/18 09:26


5 MG


 


 Cyanocobalamin


  (Vitamin B-12


 Tab)  1,000 mcg  DAILY


 PO  2/9/18 09:00


 3/11/18 08:59  2/14/18 09:27


1,000 MCG


 


 Folic Acid


  (Folvite Tab)  1 mg  HS


 PO  2/8/18 21:00


 3/10/18 20:59  2/13/18 22:17


1 MG


 


 Levothyroxine


 Sodium


  (Synthroid Tab)  88 mcg  DAILYBB


 PO  2/9/18 06:00


 3/11/18 06:59  2/14/18 05:54


88 MCG


 


 Metoprolol


 Tartrate


  (Lopressor Tab)  50 mg  BID


 PO  2/8/18 21:00


 3/10/18 20:59  2/14/18 09:28


50 MG


 


 Ascorbic Acid


  (Vitamin C Tab)  500 mg  DAILY


 PO  2/9/18 09:00


 3/11/18 08:59  2/14/18 09:24


500 MG


 


 Cholecalciferol


  (Vitamin D Tab)  2,000


 inter.unit  DAILY


 PO  2/9/18 09:00


 3/11/18 08:59  2/14/18 09:25


2,000 INTER.UNIT


 


 Cholestyramine


 Resin


  (Questran Powder


 Light)  4 gm  BID@1000,2200


 PO  2/8/18 22:00


 3/10/18 21:59  2/14/18 10:32


4 GM


 


 Hydrochlorothiazide


  (Hydrochlorothiazide


 Tab)  12.5 mg  DAILY


 PO  2/9/18 09:00


 3/11/18 08:59  2/13/18 08:08


12.5 MG


 


 Miscellaneous


 Information


  (Order Awaiting


 Action)  1 ea  QS


 N/A  2/9/18 00:00


 3/11/18 00:00   


 


 


 Potassium Chloride


  (Klor-Con Tab)  20 meq  QAM


 PO  2/9/18 09:00


 3/11/18 08:59  2/14/18 09:24


20 MEQ


 


 Oxycodone HCl


  (Roxicodone


 Immediate Rel Tab)  5 mg  Q6H  PRN


 PO  2/13/18 19:45


 2/27/18 19:44   


 


 


 Acetaminophen


 1000 mg/Empty Bag  100 ml @ 


 400 mls/hr  Q8H


 IV  2/13/18 20:00


 3/15/18 19:59  2/14/18 04:09


400 MLS/HR


 


 Enoxaparin Sodium


  (Lovenox Inj)  40 mg  DAILY


 SQ  2/14/18 09:00


 3/16/18 08:59  2/14/18 09:26


40 MG


 


 Ondansetron HCl


  (Zofran Inj)  4 mg  Q4H  PRN


 IV  2/13/18 19:45


 3/15/18 19:44   


 


 


 Docusate Sodium


  (coLACE CAP)  100 mg  BID


 PO  2/13/18 21:00


 3/15/18 20:59  2/14/18 09:28


100 MG


 


 Ketorolac


 Tromethamine


  (Toradol Inj)  15 mg  Q8H


 IV.  2/13/18 20:00


 2/15/18 12:01   


 


 


 Metoclopramide HCl


  (Reglan Inj)  10 mg  Q8


 IV.  2/13/18 22:00


 2/14/18 21:59  2/14/18 05:57


10 MG


 


 Morphine Sulfate


  (MoRPHine


 SULFATE INJ)    Q1H  PRN


 IV  2/13/18 19:45


 2/27/18 19:44   


 











Objective


Vital Signs











  Date Time  Temp Pulse Resp B/P (MAP) Pulse Ox O2 Delivery O2 Flow Rate FiO2


 


2/14/18 12:07 36.5 74 20 127/74 (91) 97 Nasal Cannula 3.0 


 


2/14/18 12:00      Nasal Cannula  


 


2/14/18 11:34  73 16  97 Nasal Cannula 3.0 


 


2/14/18 08:00      Nasal Cannula  


 


2/14/18 07:58 36.7 71 20 144/78 (100) 97 Nasal Cannula 2.0 


 


2/14/18 06:58  74 16  97 Nasal Cannula 3.0 


 


2/14/18 05:00  66  148/77 (100) 97 Nasal Cannula  


 


2/14/18 04:04 36.8 66 16 139/78 (98) 98   


 


2/14/18 04:00     98 Nasal Cannula  


 


2/14/18 04:00  61  148/76 (100) 97 Nasal Cannula 3.0 


 


2/14/18 03:00    148/77 (100)    


 


2/14/18 02:00 36.6 65  147/77 (100) 98 Nasal Cannula 3.0 


 


2/14/18 01:00  69  133/66 (88) 96 Nasal Cannula 3.0 


 


2/14/18 00:14 36.4 79 16 141/84 (103) 96  3.0 


 


2/14/18 00:00  67  132/76 (94) 98 Nasal Cannula 3.0 


 


2/14/18 00:00     98 Nasal Cannula 3.0 


 


2/13/18 22:35  76 20 140/86 (104) 99  4.0 


 


2/13/18 22:15  76 20 150/81 (104) 99  4.0 


 


2/13/18 22:00  76 20 150/85 (106) 97  4.0 


 


2/13/18 21:30  81 20 130/82 (98) 96  4.0 


 


2/13/18 21:15  70 20 160/81 (107) 98 Nasal Cannula 4.0 


 


2/13/18 21:02 35.8 71 22 147/88 (107) 99 Nasal Cannula 4.0 


 


2/13/18 20:35  72 19 134/79 97 Nasal Cannula 4 


 


2/13/18 20:25 36.2 74 17 149/82 98 Nasal Cannula 4 


 


2/13/18 20:15  83 16 150/89 97 Nasal Cannula 4 


 


2/13/18 20:05  81 24 142/84 99 Oxymask 10 


 


2/13/18 19:55  85 19 135/82 99 Oxymask 10 


 


2/13/18 19:47 36.0 84 18 110/76 97 Oxymask 10 


 


2/13/18 17:39 36.4 94 18 141/83 (102) 92 Nasal Cannula 3 


 


2/13/18 16:12  80 16  94 Nasal Cannula 2.0 


 


2/13/18 15:48 36.7 82 20 154/89 (110) 95   


 


2/13/18 15:45     95 Room Air  











Physical Exam


General Appearance:  + cachetic, + thin


Respiratory/Chest:  no respiratory distress, no accessory muscle use, + 

decreased breath sounds


Cardiovascular:  regular rate, rhythm, no edema, no murmur


Neurologic/Psychiatric:  no motor/sensory deficits, alert, normal mood/affect, 

oriented x 3





Assessment and Plan


This is an 83 year old female with a PMH of COPD, tobacco use disorder, iron 

deficiency anemia, B12 deficiency, hypothyroidism, HTN - presents with 

worsening shortness of breath, dyspnea on exertion and pleuritic chest pain and 

subsequently found to have a pneumothorax





Secondary Spontaneous Pneumothorax


2/14


appreciate CTS input


s/p surgical intervention (bleb resection) on 2/13


CXR on 2/14 suggests small apical pneumothorax


speech evaluation pending due to trouble swallowing





2/13


presented with a L sided pneumothorax, secondary to her COPD; about 20mm 

separation


She initially wanted conservative management approach to this; but due to 

worsening pneumothorax, she had a chest tube thoracostomy on 2/10


appreciate cardiothoracic surgery management - chest tube was then removed on 2/ 12


Unfortunately, the size of the pneumothorax worsened; on 2/13; CXR performed 

showing a 2.1 cm pleural separation


Plan for now is for possible talc pleurodesis; awaiting further input by 

cardiothoracic surgery





COPD/Tobacco use disorder


patient does not want to use nicotine patch at this time


does not use inhalers at home for maintenance


should have PFTs performed and possible addition of Spiriva vs. ICS/LABA


appreciate pulmonary input for further management


has completed course of steroids and antibiotics which are no longer needed at 

this time





Pulmonary Cachexia


appreciate nutritional consult; patient refusing boost or ensure at this time


she is agreeable to small snacks throughout the day which should help





Pulmonary Nodule


6-7 mm spiculated nodule noted on the R lower lobe


outpatient follow-up in 6 months with repeat chest CT





HTN


blood pressure slightly elevated this morning, though anxiety playing a part 

this morning


will monitor throughout the day


currently on Amlodipine 5mg, low dose HCTZ, and metoprolol for now





Iron Deficiency Anemia


Hgb is stable


has received Venofer in the past


outpatient hematology follow-up





Hypothyroidism


continue Synthroid





Hx. of Colon Cancer


chronic diarrhea; continue Questran





DVT ppx


SCDs





FULL CODE

## 2018-02-14 NOTE — DIAGNOSTIC IMAGING REPORT
CHEST ONE VIEW PORTABLE



CLINICAL HISTORY: bleb resection     



COMPARISON STUDY:  Chest CT February 11, 2018 and chest radiograph February 13, 2018.



FINDINGS: Left apical chest tube is in place. A small amount of gas within left

chest wall is again noted. A small left pneumothorax has decreased in size since

exam of February 13, 2018. Superior pleural separation measures 6 mm.

Postoperative findings within the left upper lung are noted. Emphysema is noted.

Cardiomediastinal silhouette is unremarkable. 



IMPRESSION:  Left chest tube in place. Small left apical pneumothorax, decreased

in size since prior exam.







Electronically signed by:  David Holly M.D.

2/14/2018 7:05 AM



Dictated Date/Time:  2/14/2018 7:03 AM

## 2018-02-14 NOTE — SURGERY PROGRESS NOTE
DATE: 02/14/2018

 

Ms. Vilchis was seen today on 02/14/2018, one day after a thoracoscopic apical

bleb resection and pleurectomy.  She looks very good.  She has very little in

the way of any pain, has good cough.  Her x-ray really does not show much in

the way of a pneumothorax.  She really does not have an air leak which is

excellent.  She is draining very little fluid.  I am quite happy with

appearance of her chest x-ray.  I plan on leaving this chest tube in there

for another 24-48 hours.  I discussed this with the patient and her family.

## 2018-02-15 VITALS
DIASTOLIC BLOOD PRESSURE: 69 MMHG | SYSTOLIC BLOOD PRESSURE: 150 MMHG | OXYGEN SATURATION: 96 % | HEART RATE: 86 BPM | TEMPERATURE: 98.96 F

## 2018-02-15 VITALS
HEART RATE: 90 BPM | SYSTOLIC BLOOD PRESSURE: 114 MMHG | TEMPERATURE: 98.6 F | DIASTOLIC BLOOD PRESSURE: 71 MMHG | OXYGEN SATURATION: 98 %

## 2018-02-15 VITALS
SYSTOLIC BLOOD PRESSURE: 150 MMHG | TEMPERATURE: 98.06 F | HEART RATE: 76 BPM | OXYGEN SATURATION: 98 % | DIASTOLIC BLOOD PRESSURE: 83 MMHG

## 2018-02-15 VITALS
SYSTOLIC BLOOD PRESSURE: 114 MMHG | HEART RATE: 90 BPM | OXYGEN SATURATION: 98 % | TEMPERATURE: 98.6 F | DIASTOLIC BLOOD PRESSURE: 71 MMHG

## 2018-02-15 VITALS
OXYGEN SATURATION: 99 % | DIASTOLIC BLOOD PRESSURE: 68 MMHG | HEART RATE: 71 BPM | TEMPERATURE: 98.06 F | SYSTOLIC BLOOD PRESSURE: 128 MMHG

## 2018-02-15 VITALS
DIASTOLIC BLOOD PRESSURE: 72 MMHG | OXYGEN SATURATION: 96 % | SYSTOLIC BLOOD PRESSURE: 115 MMHG | HEART RATE: 93 BPM | TEMPERATURE: 98.96 F

## 2018-02-15 VITALS
TEMPERATURE: 98.24 F | DIASTOLIC BLOOD PRESSURE: 82 MMHG | HEART RATE: 78 BPM | SYSTOLIC BLOOD PRESSURE: 151 MMHG | OXYGEN SATURATION: 97 %

## 2018-02-15 VITALS — HEART RATE: 84 BPM | OXYGEN SATURATION: 98 %

## 2018-02-15 LAB
BUN SERPL-MCNC: 17 MG/DL (ref 7–18)
CALCIUM SERPL-MCNC: 7.9 MG/DL (ref 8.5–10.1)
CO2 SERPL-SCNC: 33 MMOL/L (ref 21–32)
CREAT SERPL-MCNC: 0.62 MG/DL (ref 0.6–1.2)
EOSINOPHIL NFR BLD AUTO: 135 K/UL (ref 130–400)
GLUCOSE SERPL-MCNC: 84 MG/DL (ref 70–99)
HCT VFR BLD CALC: 36.8 % (ref 37–47)
HGB BLD-MCNC: 12.6 G/DL (ref 12–16)
MCH RBC QN AUTO: 31.4 PG (ref 25–34)
MCHC RBC AUTO-ENTMCNC: 34.2 G/DL (ref 32–36)
MCV RBC AUTO: 91.8 FL (ref 80–100)
PMV BLD AUTO: 9.3 FL (ref 7.4–10.4)
POTASSIUM SERPL-SCNC: 3.3 MMOL/L (ref 3.5–5.1)
RED CELL DISTRIBUTION WIDTH CV: 13.3 % (ref 11.5–14.5)
RED CELL DISTRIBUTION WIDTH SD: 44.6 FL (ref 36.4–46.3)
SODIUM SERPL-SCNC: 138 MMOL/L (ref 136–145)
WBC # BLD AUTO: 8.59 K/UL (ref 4.8–10.8)

## 2018-02-15 RX ADMIN — METOPROLOL TARTRATE SCH MG: 50 TABLET, FILM COATED ORAL at 09:00

## 2018-02-15 RX ADMIN — METOPROLOL TARTRATE SCH MG: 50 TABLET, FILM COATED ORAL at 19:57

## 2018-02-15 RX ADMIN — POTASSIUM CHLORIDE SCH MEQ: 1500 TABLET, EXTENDED RELEASE ORAL at 09:00

## 2018-02-15 RX ADMIN — OXYCODONE HYDROCHLORIDE AND ACETAMINOPHEN SCH MG: 500 TABLET ORAL at 09:00

## 2018-02-15 RX ADMIN — DOCUSATE SODIUM SCH MG: 100 CAPSULE, LIQUID FILLED ORAL at 09:00

## 2018-02-15 RX ADMIN — ALUMINUM ZIRCONIUM TRICHLOROHYDREX GLY SCH EA: 0.2 STICK TOPICAL at 08:00

## 2018-02-15 RX ADMIN — CHOLESTYRAMINE SCH GM: 4 POWDER, FOR SUSPENSION ORAL at 10:00

## 2018-02-15 RX ADMIN — IPRATROPIUM BROMIDE AND ALBUTEROL SULFATE SCH ML: .5; 3 SOLUTION RESPIRATORY (INHALATION) at 11:21

## 2018-02-15 RX ADMIN — ACETAMINOPHEN SCH MLS/HR: 10 INJECTION, SOLUTION INTRAVENOUS at 20:03

## 2018-02-15 RX ADMIN — ALUMINUM ZIRCONIUM TRICHLOROHYDREX GLY SCH EA: 0.2 STICK TOPICAL at 15:58

## 2018-02-15 RX ADMIN — CHOLESTYRAMINE SCH GM: 4 POWDER, FOR SUSPENSION ORAL at 22:25

## 2018-02-15 RX ADMIN — IPRATROPIUM BROMIDE AND ALBUTEROL SCH PUFFS: 20; 100 SPRAY, METERED RESPIRATORY (INHALATION) at 17:00

## 2018-02-15 RX ADMIN — ACETAMINOPHEN SCH MLS/HR: 10 INJECTION, SOLUTION INTRAVENOUS at 12:00

## 2018-02-15 RX ADMIN — HYDROCHLOROTHIAZIDE SCH MG: 25 TABLET ORAL at 09:00

## 2018-02-15 RX ADMIN — IPRATROPIUM BROMIDE AND ALBUTEROL SULFATE SCH ML: .5; 3 SOLUTION RESPIRATORY (INHALATION) at 07:05

## 2018-02-15 RX ADMIN — HEPARIN SODIUM SCH UNIT: 10000 INJECTION, SOLUTION INTRAVENOUS; SUBCUTANEOUS at 22:28

## 2018-02-15 RX ADMIN — DOCUSATE SODIUM SCH MG: 100 CAPSULE, LIQUID FILLED ORAL at 19:57

## 2018-02-15 RX ADMIN — IPRATROPIUM BROMIDE AND ALBUTEROL SULFATE SCH ML: .5; 3 SOLUTION RESPIRATORY (INHALATION) at 14:52

## 2018-02-15 RX ADMIN — KETOROLAC TROMETHAMINE SCH MG: 15 INJECTION INTRAMUSCULAR; INTRAVENOUS at 12:00

## 2018-02-15 RX ADMIN — HEPARIN SODIUM SCH UNIT: 10000 INJECTION, SOLUTION INTRAVENOUS; SUBCUTANEOUS at 13:45

## 2018-02-15 RX ADMIN — Medication SCH MCG: at 09:00

## 2018-02-15 RX ADMIN — LEVOTHYROXINE SODIUM SCH MCG: 88 TABLET ORAL at 06:34

## 2018-02-15 RX ADMIN — Medication SCH INTER.UNIT: at 09:00

## 2018-02-15 RX ADMIN — ACETAMINOPHEN SCH MLS/HR: 10 INJECTION, SOLUTION INTRAVENOUS at 03:57

## 2018-02-15 RX ADMIN — IPRATROPIUM BROMIDE AND ALBUTEROL SCH PUFFS: 20; 100 SPRAY, METERED RESPIRATORY (INHALATION) at 22:23

## 2018-02-15 RX ADMIN — AMLODIPINE BESYLATE SCH MG: 5 TABLET ORAL at 09:00

## 2018-02-15 RX ADMIN — KETOROLAC TROMETHAMINE SCH MG: 15 INJECTION INTRAMUSCULAR; INTRAVENOUS at 03:57

## 2018-02-15 RX ADMIN — HEPARIN SODIUM SCH UNIT: 10000 INJECTION, SOLUTION INTRAVENOUS; SUBCUTANEOUS at 06:36

## 2018-02-15 NOTE — PROGRESS NOTE
Subjective


Date of Service:


Feb 15, 2018.


Subjective


Pt evaluation today including:  conversation w/ patient, physical exam, lab 

review, review of studies, conversation w/ consultant, review of inpatient 

medication list


Saw/examined the patient in room 233


No problems/issues to note today


She has had trouble swallowing, as she thinks there is a pill stuck in her 

throat


worked with speech/swallow


denies chest pain or shortness of breath today





Problem List


Medical Problems:


(1) Pneumothorax


Status: Acute  





(2) Respiratory failure with hypoxia


Status: Acute  





(3) SOB (shortness of breath)


Status: Acute  











Review of Systems


ENT:  + trouble swallowing


Respiratory:  No cough, No sputum, No wheezing, No shortness of breath, No 

dyspnea on exertion, No dyspnea at rest, No hemoptysis


Cardiac:  No chest pain, No edema, No palpitations





Medications





Current Inpatient Medications








 Medications


  (Trade)  Dose


 Ordered  Sig/Adali


 Route  Start Time


 Stop Time Status Last Admin


Dose Admin


 


 Albuterol/


 Ipratropium


  (Duoneb)  3 ml  QIDR


 INH  2/8/18 16:00


 3/10/18 15:59  2/15/18 07:05


3 ML


 


 Amlodipine


 Besylate


  (Norvasc Tab)  5 mg  DAILY


 PO  2/9/18 09:00


 3/11/18 08:59  2/14/18 09:26


5 MG


 


 Cyanocobalamin


  (Vitamin B-12


 Tab)  1,000 mcg  DAILY


 PO  2/9/18 09:00


 3/11/18 08:59  2/14/18 09:27


1,000 MCG


 


 Folic Acid


  (Folvite Tab)  1 mg  HS


 PO  2/8/18 21:00


 3/10/18 20:59  2/14/18 20:09


1 MG


 


 Levothyroxine


 Sodium


  (Synthroid Tab)  88 mcg  DAILYBB


 PO  2/9/18 06:00


 3/11/18 06:59  2/15/18 06:34


88 MCG


 


 Metoprolol


 Tartrate


  (Lopressor Tab)  50 mg  BID


 PO  2/8/18 21:00


 3/10/18 20:59  2/14/18 20:10


50 MG


 


 Ascorbic Acid


  (Vitamin C Tab)  500 mg  DAILY


 PO  2/9/18 09:00


 3/11/18 08:59  2/14/18 09:24


500 MG


 


 Cholecalciferol


  (Vitamin D Tab)  2,000


 inter.unit  DAILY


 PO  2/9/18 09:00


 3/11/18 08:59  2/14/18 09:25


2,000 INTER.UNIT


 


 Cholestyramine


 Resin


  (Questran Powder


 Light)  4 gm  BID@1000,2200


 PO  2/8/18 22:00


 3/10/18 21:59  2/14/18 23:02


4 GM


 


 Hydrochlorothiazide


  (Hydrochlorothiazide


 Tab)  12.5 mg  DAILY


 PO  2/9/18 09:00


 3/11/18 08:59  2/13/18 08:08


12.5 MG


 


 Miscellaneous


 Information


  (Order Awaiting


 Action)  1 ea  QS


 N/A  2/9/18 00:00


 3/11/18 00:00   


 


 


 Potassium Chloride


  (Klor-Con Tab)  20 meq  QAM


 PO  2/9/18 09:00


 3/11/18 08:59  2/14/18 09:24


20 MEQ


 


 Oxycodone HCl


  (Roxicodone


 Immediate Rel Tab)  5 mg  Q6H  PRN


 PO  2/13/18 19:45


 2/27/18 19:44   


 


 


 Acetaminophen


 1000 mg/Empty Bag  100 ml @ 


 400 mls/hr  Q8H


 IV  2/13/18 20:00


 3/15/18 19:59  2/15/18 03:57


400 MLS/HR


 


 Ondansetron HCl


  (Zofran Inj)  4 mg  Q4H  PRN


 IV  2/13/18 19:45


 3/15/18 19:44   


 


 


 Docusate Sodium


  (coLACE CAP)  100 mg  BID


 PO  2/13/18 21:00


 3/15/18 20:59  2/14/18 20:09


100 MG


 


 Ketorolac


 Tromethamine


  (Toradol Inj)  15 mg  Q8H


 IV.  2/13/18 20:00


 2/15/18 12:01  2/15/18 03:57


15 MG


 


 Morphine Sulfate


  (MoRPHine


 SULFATE INJ)    Q1H  PRN


 IV  2/13/18 19:45


 2/27/18 19:44   


 


 


 Heparin Sodium


  (Porcine)


  (Heparin Sq 5000


 Unit/0.5ml)  5,000 unit  Q8


 SQ  2/15/18 06:00


 3/17/18 05:59  2/15/18 06:36


5,000 UNIT











Objective


Vital Signs











  Date Time  Temp Pulse Resp B/P (MAP) Pulse Ox O2 Delivery O2 Flow Rate FiO2


 


2/15/18 07:30      Nasal Cannula 2.0 


 


2/15/18 07:12 36.7 71 20 128/68 (88) 99  2.0 


 


2/15/18 04:00      Nasal Cannula 2.0 


 


2/15/18 03:54 36.7 76 20 150/83 (105) 98 Nasal Cannula 2.0 


 


2/14/18 23:37 36.6 73 17 131/70 (90) 96 Nasal Cannula 2.0 


 


2/14/18 23:07      Nasal Cannula 2.0 


 


2/14/18 20:16  68 16  97 Nasal Cannula 2.0 


 


2/14/18 20:08      Nasal Cannula 2.0 


 


2/14/18 19:35 36.4 78 20 109/74 (86) 95 Nasal Cannula 2.0 


 


2/14/18 16:00      Nasal Cannula  


 


2/14/18 15:13 37.0 79 16 120/69 (86) 95 Nasal Cannula 2.0 


 


2/14/18 14:45  78 16  98 Nasal Cannula 3.0 


 


2/14/18 12:07 36.5 74 20 127/74 (91) 97 Nasal Cannula 3.0 


 


2/14/18 12:00      Nasal Cannula  


 


2/14/18 11:34  73 16  97 Nasal Cannula 3.0 











Physical Exam


General Appearance:  no apparent distress, + cachetic


Respiratory/Chest:  no respiratory distress, no accessory muscle use, + crackles

 (L side)


Cardiovascular:  regular rate, rhythm, no edema, no murmur





Laboratory Results





Last 24 Hours








Test


  2/15/18


06:11


 


White Blood Count 8.59 K/uL 


 


Red Blood Count 4.01 M/uL 


 


Hemoglobin 12.6 g/dL 


 


Hematocrit 36.8 % 


 


Mean Corpuscular Volume 91.8 fL 


 


Mean Corpuscular Hemoglobin 31.4 pg 


 


Mean Corpuscular Hemoglobin


Concent 34.2 g/dl 


 


 


RDW Standard Deviation 44.6 fL 


 


RDW Coefficient of Variation 13.3 % 


 


Platelet Count 135 K/uL 


 


Mean Platelet Volume 9.3 fL 


 


Sodium Level 138 mmol/L 


 


Potassium Level 3.3 mmol/L 


 


Chloride Level 100 mmol/L 


 


Carbon Dioxide Level 33 mmol/L 


 


Anion Gap 5.0 mmol/L 


 


Blood Urea Nitrogen 17 mg/dl 


 


Creatinine 0.62 mg/dl 


 


Est Creatinine Clear Calc


Drug Dose 52.5 ml/min 


 


 


Estimated GFR (


American) 96.6 


 


 


Estimated GFR (Non-


American 83.4 


 


 


BUN/Creatinine Ratio 27.4 


 


Random Glucose 84 mg/dl 


 


Calcium Level 7.9 mg/dl 











Assessment and Plan


This is an 83 year old female with a PMH of COPD, tobacco use disorder, iron 

deficiency anemia, B12 deficiency, hypothyroidism, HTN - presents with 

worsening shortness of breath, dyspnea on exertion and pleuritic chest pain and 

subsequently found to have a pneumothorax





Secondary Spontaneous Pneumothorax


2/15


doing well, chest tube in place


to be removed in AM, possible d/c home in 1-2 days


monitor for aspiration





2/14


appreciate CTS input


s/p surgical intervention (bleb resection) on 2/13


CXR on 2/14 suggests small apical pneumothorax


speech evaluation pending due to trouble swallowing





2/13


presented with a L sided pneumothorax, secondary to her COPD; about 20mm 

separation


She initially wanted conservative management approach to this; but due to 

worsening pneumothorax, she had a chest tube thoracostomy on 2/10


appreciate cardiothoracic surgery management - chest tube was then removed on 2/ 12


Unfortunately, the size of the pneumothorax worsened; on 2/13; CXR performed 

showing a 2.1 cm pleural separation


Plan for now is for possible talc pleurodesis; awaiting further input by 

cardiothoracic surgery





COPD/Tobacco use disorder


patient does not want to use nicotine patch at this time


does not use inhalers at home for maintenance


should have PFTs performed and possible addition of Spiriva vs. ICS/LABA


appreciate pulmonary input for further management


has completed course of steroids and antibiotics which are no longer needed at 

this time





Pulmonary Cachexia


appreciate nutritional consult; patient refusing boost or ensure at this time


she is agreeable to small snacks throughout the day which should help





Pulmonary Nodule


6-7 mm spiculated nodule noted on the R lower lobe


outpatient follow-up in 6 months with repeat chest CT





HTN


blood pressure slightly elevated this morning, though anxiety playing a part 

this morning


will monitor throughout the day


currently on Amlodipine 5mg, low dose HCTZ, and metoprolol for now





Iron Deficiency Anemia


Hgb is stable


has received Venofer in the past


outpatient hematology follow-up





Hypothyroidism


continue Synthroid





Hx. of Colon Cancer


chronic diarrhea; continue Questran





DVT ppx


SCDs





FULL CODE

## 2018-02-15 NOTE — DIAGNOSTIC IMAGING REPORT
CHEST ONE VIEW PORTABLE



CLINICAL HISTORY: 83 years-old Female presenting with bleb resection . 



TECHNIQUE: Portable upright AP view of the chest was obtained.



COMPARISON: 2/14/2018.



FINDINGS:

Left large bore  pleural drain remains in place. Atherosclerosis of aortic arch.

Cardiac silhouette mildly enlarged. Postsurgical changes of the left apex. No

significant residual left apical pneumothorax. Lungs are hyperinflated,

unchanged. Osteopenia suspected. Scoliotic curvature of the lower thoracic and

upper lumbar spine. Subcutaneous emphysema extensively along the left chest wall

associated with the pleural drain. 



IMPRESSION:

1.  No symmetrical residual left apical pneumothorax with the large bore left

pleural drain remaining in place.



2.  Post surgical changes of the left apex.



3.  Underlying emphysema.







Electronically signed by:  Sai Fraga M.D.

2/15/2018 8:00 AM



Dictated Date/Time:  2/15/2018 7:59 AM

## 2018-02-15 NOTE — PROGRESS NOTE
Progress Note


Date of Service


Feb 15, 2018.





Progress Note


Christelle Vilchis is seen today 2 days status post an apical bleb resection and 

partial pleurectomy.  She does not have an air leak.  She has multiple 

complaints however.  She is having difficulty swallowing her throat hurts after 

being intubated for the surgery.  Her rhythm has been stable and we are going 

to have her transferred to the floor.  If things look good tomorrow I am going 

to remove her chest tube and let her be discharged.

## 2018-02-16 VITALS
OXYGEN SATURATION: 96 % | DIASTOLIC BLOOD PRESSURE: 86 MMHG | SYSTOLIC BLOOD PRESSURE: 139 MMHG | HEART RATE: 86 BPM | TEMPERATURE: 97.52 F

## 2018-02-16 VITALS — DIASTOLIC BLOOD PRESSURE: 73 MMHG | SYSTOLIC BLOOD PRESSURE: 120 MMHG | HEART RATE: 82 BPM

## 2018-02-16 VITALS
TEMPERATURE: 96.98 F | SYSTOLIC BLOOD PRESSURE: 123 MMHG | DIASTOLIC BLOOD PRESSURE: 70 MMHG | HEART RATE: 70 BPM | OXYGEN SATURATION: 96 %

## 2018-02-16 VITALS
SYSTOLIC BLOOD PRESSURE: 135 MMHG | OXYGEN SATURATION: 99 % | HEART RATE: 76 BPM | TEMPERATURE: 97.52 F | DIASTOLIC BLOOD PRESSURE: 77 MMHG

## 2018-02-16 VITALS — OXYGEN SATURATION: 93 %

## 2018-02-16 VITALS — OXYGEN SATURATION: 88 %

## 2018-02-16 LAB
BUN SERPL-MCNC: 13 MG/DL (ref 7–18)
CALCIUM SERPL-MCNC: 8.1 MG/DL (ref 8.5–10.1)
CO2 SERPL-SCNC: 34 MMOL/L (ref 21–32)
CREAT SERPL-MCNC: 0.52 MG/DL (ref 0.6–1.2)
EOSINOPHIL NFR BLD AUTO: 142 K/UL (ref 130–400)
GLUCOSE SERPL-MCNC: 80 MG/DL (ref 70–99)
HCT VFR BLD CALC: 37.2 % (ref 37–47)
HGB BLD-MCNC: 12.3 G/DL (ref 12–16)
MCH RBC QN AUTO: 30.9 PG (ref 25–34)
MCHC RBC AUTO-ENTMCNC: 33.1 G/DL (ref 32–36)
MCV RBC AUTO: 93.5 FL (ref 80–100)
PMV BLD AUTO: 9.1 FL (ref 7.4–10.4)
POTASSIUM SERPL-SCNC: 3.5 MMOL/L (ref 3.5–5.1)
RED CELL DISTRIBUTION WIDTH CV: 13.3 % (ref 11.5–14.5)
RED CELL DISTRIBUTION WIDTH SD: 45.7 FL (ref 36.4–46.3)
SODIUM SERPL-SCNC: 139 MMOL/L (ref 136–145)
WBC # BLD AUTO: 9.55 K/UL (ref 4.8–10.8)

## 2018-02-16 RX ADMIN — METOPROLOL TARTRATE SCH MG: 50 TABLET, FILM COATED ORAL at 20:37

## 2018-02-16 RX ADMIN — IPRATROPIUM BROMIDE AND ALBUTEROL SCH PUFFS: 20; 100 SPRAY, METERED RESPIRATORY (INHALATION) at 17:16

## 2018-02-16 RX ADMIN — CHOLESTYRAMINE SCH GM: 4 POWDER, FOR SUSPENSION ORAL at 09:56

## 2018-02-16 RX ADMIN — OXYCODONE HYDROCHLORIDE AND ACETAMINOPHEN SCH MG: 500 TABLET ORAL at 09:55

## 2018-02-16 RX ADMIN — LEVOTHYROXINE SODIUM SCH MCG: 88 TABLET ORAL at 05:47

## 2018-02-16 RX ADMIN — DOCUSATE SODIUM SCH MG: 100 CAPSULE, LIQUID FILLED ORAL at 20:37

## 2018-02-16 RX ADMIN — METOPROLOL TARTRATE SCH MG: 50 TABLET, FILM COATED ORAL at 09:55

## 2018-02-16 RX ADMIN — Medication SCH MCG: at 09:55

## 2018-02-16 RX ADMIN — HEPARIN SODIUM SCH UNIT: 10000 INJECTION, SOLUTION INTRAVENOUS; SUBCUTANEOUS at 05:49

## 2018-02-16 RX ADMIN — ACETAMINOPHEN SCH MLS/HR: 10 INJECTION, SOLUTION INTRAVENOUS at 13:26

## 2018-02-16 RX ADMIN — HEPARIN SODIUM SCH UNIT: 10000 INJECTION, SOLUTION INTRAVENOUS; SUBCUTANEOUS at 21:13

## 2018-02-16 RX ADMIN — Medication SCH INTER.UNIT: at 09:54

## 2018-02-16 RX ADMIN — IPRATROPIUM BROMIDE AND ALBUTEROL SCH PUFFS: 20; 100 SPRAY, METERED RESPIRATORY (INHALATION) at 13:27

## 2018-02-16 RX ADMIN — DOCUSATE SODIUM SCH MG: 100 CAPSULE, LIQUID FILLED ORAL at 09:54

## 2018-02-16 RX ADMIN — ACETAMINOPHEN SCH MLS/HR: 10 INJECTION, SOLUTION INTRAVENOUS at 20:36

## 2018-02-16 RX ADMIN — IPRATROPIUM BROMIDE AND ALBUTEROL SCH PUFFS: 20; 100 SPRAY, METERED RESPIRATORY (INHALATION) at 20:36

## 2018-02-16 RX ADMIN — ALUMINUM ZIRCONIUM TRICHLOROHYDREX GLY SCH EA: 0.2 STICK TOPICAL at 16:00

## 2018-02-16 RX ADMIN — POTASSIUM CHLORIDE SCH MEQ: 1500 TABLET, EXTENDED RELEASE ORAL at 09:54

## 2018-02-16 RX ADMIN — HEPARIN SODIUM SCH UNIT: 10000 INJECTION, SOLUTION INTRAVENOUS; SUBCUTANEOUS at 13:31

## 2018-02-16 RX ADMIN — ALUMINUM ZIRCONIUM TRICHLOROHYDREX GLY SCH EA: 0.2 STICK TOPICAL at 07:15

## 2018-02-16 RX ADMIN — IPRATROPIUM BROMIDE AND ALBUTEROL SCH PUFFS: 20; 100 SPRAY, METERED RESPIRATORY (INHALATION) at 09:53

## 2018-02-16 RX ADMIN — ALUMINUM ZIRCONIUM TRICHLOROHYDREX GLY SCH EA: 0.2 STICK TOPICAL at 00:00

## 2018-02-16 RX ADMIN — AMLODIPINE BESYLATE SCH MG: 5 TABLET ORAL at 09:54

## 2018-02-16 RX ADMIN — CHOLESTYRAMINE SCH GM: 4 POWDER, FOR SUSPENSION ORAL at 21:10

## 2018-02-16 RX ADMIN — HYDROCHLOROTHIAZIDE SCH MG: 25 TABLET ORAL at 09:55

## 2018-02-16 RX ADMIN — ACETAMINOPHEN SCH MLS/HR: 10 INJECTION, SOLUTION INTRAVENOUS at 03:04

## 2018-02-16 NOTE — PROGRESS NOTE
Subjective


Date of Service:


Feb 16, 2018.


Subjective


Pt evaluation today including:  conversation w/ patient, physical exam, lab 

review, review of studies, review of inpatient medication list


Saw/examined the patient in room 375


She's doing well, no significant issues to note


No shortness of breath


Feels her "choking" episodes have improved as well





Problem List


Medical Problems:


(1) Pneumothorax


Status: Acute  





(2) Respiratory failure with hypoxia


Status: Acute  





(3) SOB (shortness of breath)


Status: Acute  











Review of Systems


Constitutional:  No fever, No chills, No weakness


Respiratory:  + cough, + shortness of breath, No sputum, No wheezing, No 

dyspnea on exertion, No dyspnea at rest, No hemoptysis


Cardiac:  No chest pain, No edema, No palpitations





Medications





Current Inpatient Medications








 Medications


  (Trade)  Dose


 Ordered  Sig/Adali


 Route  Start Time


 Stop Time Status Last Admin


Dose Admin


 


 Amlodipine


 Besylate


  (Norvasc Tab)  5 mg  DAILY


 PO  2/9/18 09:00


 3/11/18 08:59  2/16/18 09:54


5 MG


 


 Cyanocobalamin


  (Vitamin B-12


 Tab)  1,000 mcg  DAILY


 PO  2/9/18 09:00


 3/11/18 08:59  2/16/18 09:55


1,000 MCG


 


 Folic Acid


  (Folvite Tab)  1 mg  HS


 PO  2/8/18 21:00


 3/10/18 20:59  2/15/18 19:57


1 MG


 


 Levothyroxine


 Sodium


  (Synthroid Tab)  88 mcg  DAILYBB


 PO  2/9/18 06:00


 3/11/18 06:59  2/16/18 05:47


88 MCG


 


 Metoprolol


 Tartrate


  (Lopressor Tab)  50 mg  BID


 PO  2/8/18 21:00


 3/10/18 20:59  2/16/18 09:55


50 MG


 


 Ascorbic Acid


  (Vitamin C Tab)  500 mg  DAILY


 PO  2/9/18 09:00


 3/11/18 08:59  2/16/18 09:55


500 MG


 


 Cholecalciferol


  (Vitamin D Tab)  2,000


 inter.unit  DAILY


 PO  2/9/18 09:00


 3/11/18 08:59  2/16/18 09:54


2,000 INTER.UNIT


 


 Cholestyramine


 Resin


  (Questran Powder


 Light)  4 gm  BID@1000,2200


 PO  2/8/18 22:00


 3/10/18 21:59  2/15/18 22:25


4 GM


 


 Hydrochlorothiazide


  (Hydrochlorothiazide


 Tab)  12.5 mg  DAILY


 PO  2/9/18 09:00


 3/11/18 08:59  2/16/18 09:55


12.5 MG


 


 Miscellaneous


 Information


  (Order Awaiting


 Action)  1 ea  QS


 N/A  2/9/18 00:00


 3/11/18 00:00   


 


 


 Potassium Chloride


  (Klor-Con Tab)  20 meq  QAM


 PO  2/9/18 09:00


 3/11/18 08:59  2/16/18 09:54


20 MEQ


 


 Oxycodone HCl


  (Roxicodone


 Immediate Rel Tab)  5 mg  Q6H  PRN


 PO  2/13/18 19:45


 2/27/18 19:44   


 


 


 Acetaminophen


 1000 mg/Empty Bag  100 ml @ 


 400 mls/hr  Q8H


 IV  2/13/18 20:00


 3/15/18 19:59  2/16/18 03:04


400 MLS/HR


 


 Ondansetron HCl


  (Zofran Inj)  4 mg  Q4H  PRN


 IV  2/13/18 19:45


 3/15/18 19:44   


 


 


 Docusate Sodium


  (coLACE CAP)  100 mg  BID


 PO  2/13/18 21:00


 3/15/18 20:59  2/16/18 09:54


100 MG


 


 Morphine Sulfate


  (MoRPHine


 SULFATE INJ)    Q1H  PRN


 IV  2/13/18 19:45


 2/27/18 19:44   


 


 


 Heparin Sodium


  (Porcine)


  (Heparin Sq 5000


 Unit/0.5ml)  5,000 unit  Q8


 SQ  2/15/18 06:00


 3/17/18 05:59  2/16/18 05:49


5,000 UNIT


 


 Albuterol/


 Ipratropium


  (Combivent


 Respimat Inh)  1 puffs  QID


 INH  2/15/18 17:00


 3/17/18 16:59  2/16/18 09:53


1 PUFFS











Objective


Vital Signs











  Date Time  Temp Pulse Resp B/P (MAP) Pulse Ox O2 Delivery O2 Flow Rate FiO2


 


2/16/18 08:30      Nasal Cannula 2.0 


 


2/16/18 07:25 36.4 76 18 135/77 (96) 99 Nasal Cannula 2.0 


 


2/16/18 06:30     93 Nasal Cannula 2.0 


 


2/16/18 06:29     88 Room Air  


 


2/16/18 00:00      Nasal Cannula 2.0 


 


2/15/18 22:57 36.8 78 16 151/82 (105) 97 Nasal Cannula 2.0 


 


2/15/18 18:38 37.2 86 16 150/69 (96) 96 Nasal Cannula 2.0 


 


2/15/18 17:45      Nasal Cannula 2.0 


 


2/15/18 17:28 37.0 90 18  98  2.0 


 


2/15/18 16:00      Nasal Cannula 2.0 


 


2/15/18 15:38 37.0 90 18 114/71 (85) 98 Nasal Cannula 2.0 


 


2/15/18 14:52  84 14  98 Nasal Cannula 2.0 











Physical Exam


General Appearance:  no apparent distress


Respiratory/Chest:  no respiratory distress, no accessory muscle use


Cardiovascular:  regular rate, rhythm, no edema, no murmur


Abdomen:  normal bowel sounds, non tender, soft


Extremities:  normal inspection, no pedal edema


Neurologic/Psychiatric:  no motor/sensory deficits, alert, normal mood/affect





Laboratory Results





Last 24 Hours








Test


  2/16/18


07:40


 


White Blood Count 9.55 K/uL 


 


Red Blood Count 3.98 M/uL 


 


Hemoglobin 12.3 g/dL 


 


Hematocrit 37.2 % 


 


Mean Corpuscular Volume 93.5 fL 


 


Mean Corpuscular Hemoglobin 30.9 pg 


 


Mean Corpuscular Hemoglobin


Concent 33.1 g/dl 


 


 


RDW Standard Deviation 45.7 fL 


 


RDW Coefficient of Variation 13.3 % 


 


Platelet Count 142 K/uL 


 


Mean Platelet Volume 9.1 fL 


 


Sodium Level 139 mmol/L 


 


Potassium Level 3.5 mmol/L 


 


Chloride Level 101 mmol/L 


 


Carbon Dioxide Level 34 mmol/L 


 


Anion Gap 4.0 mmol/L 


 


Blood Urea Nitrogen 13 mg/dl 


 


Creatinine 0.52 mg/dl 


 


Est Creatinine Clear Calc


Drug Dose 58.8 ml/min 


 


 


Estimated GFR (


American) 102.4 


 


 


Estimated GFR (Non-


American 88.4 


 


 


BUN/Creatinine Ratio 25.3 


 


Random Glucose 80 mg/dl 


 


Calcium Level 8.1 mg/dl 











Assessment and Plan


This is an 83 year old female with a PMH of COPD, tobacco use disorder, iron 

deficiency anemia, B12 deficiency, hypothyroidism, HTN - presents with 

worsening shortness of breath, dyspnea on exertion and pleuritic chest pain and 

subsequently found to have a pneumothorax





Secondary Spontaneous Pneumothorax


2/16


appreciate CTS input


chest tube removed


CXR suggests improvement of pneumothorax, residual emphysema


will recheck CXR in AM


likely d/c in AM





2/15


doing well, chest tube in place


to be removed in AM, possible d/c home in 1-2 days


monitor for aspiration





2/14


appreciate CTS input


s/p surgical intervention (bleb resection) on 2/13


CXR on 2/14 suggests small apical pneumothorax


speech evaluation pending due to trouble swallowing





2/13


presented with a L sided pneumothorax, secondary to her COPD; about 20mm 

separation


She initially wanted conservative management approach to this; but due to 

worsening pneumothorax, she had a chest tube thoracostomy on 2/10


appreciate cardiothoracic surgery management - chest tube was then removed on 2/ 12


Unfortunately, the size of the pneumothorax worsened; on 2/13; CXR performed 

showing a 2.1 cm pleural separation


Plan for now is for possible talc pleurodesis; awaiting further input by 

cardiothoracic surgery





COPD/Tobacco use disorder


patient does not want to use nicotine patch at this time


does not use inhalers at home for maintenance


should have PFTs performed and possible addition of Spiriva vs. ICS/LABA


appreciate pulmonary input for further management


has completed course of steroids and antibiotics which are no longer needed at 

this time





Pulmonary Cachexia


appreciate nutritional consult; patient refusing boost or ensure at this time


she is agreeable to small snacks throughout the day which should help





Pulmonary Nodule


6-7 mm spiculated nodule noted on the R lower lobe


outpatient follow-up in 6 months with repeat chest CT





HTN


blood pressure slightly elevated this morning, though anxiety playing a part 

this morning


will monitor throughout the day


currently on Amlodipine 5mg, low dose HCTZ, and metoprolol for now





Iron Deficiency Anemia


Hgb is stable


has received Venofer in the past


outpatient hematology follow-up





Hypothyroidism


continue Synthroid





Hx. of Colon Cancer


chronic diarrhea; continue Questran





DVT ppx


SCDs





FULL CODE

## 2018-02-16 NOTE — DIAGNOSTIC IMAGING REPORT
CHEST ONE VIEW PORTABLE



HISTORY:  83 years-old Female tube removal status post removal of left-sided

chest tube



COMPARISON: Chest radiograph 2/15/2018



TECHNIQUE: Portable AP view of the chest



FINDINGS: 

Postsurgical changes of the left lung apex are noted with interval removal of

the left-sided chest tube. No definite residual left-sided pneumothorax

identified. There is persistent subcutaneous emphysema along the left chest

wall. Cardiac silhouette is within normal limits. Atherosclerosis of the aorta.

Lungs are hyperinflated with bibasilar atelectasis/scarring which is unchanged.

The bones appear moderately demineralized and appear grossly intact.

Levoscoliosis of the lumbar spine is partially imaged.



IMPRESSION: 

Postsurgical changes of the left lung apex with interval removal of the

left-sided chest tube. There is persistent subcutaneous emphysema along the left

lateral chest wall without definite pneumothorax identified.







The above report was generated using voice recognition software. It may contain

grammatical, syntax or spelling errors.







Electronically signed by:  Igor Posey M.D.

2/16/2018 11:26 AM



Dictated Date/Time:  2/16/2018 11:23 AM

## 2018-02-16 NOTE — PROGRESS NOTE
Progress Note


Date of Service


Feb 16, 2018.





Progress Note


Mrs. Vilchis was seen today.  She has no air leak and we removed her chest tube.  

Her x-ray looks quite good.  We are going to try and wean her off the oxygen 

and hopefully get her out of the hospital tomorrow.  I am quite pleased with 

all of her incisions and her lack of pain.  She needs to be pushed to walk.

## 2018-02-17 VITALS
SYSTOLIC BLOOD PRESSURE: 132 MMHG | HEART RATE: 85 BPM | DIASTOLIC BLOOD PRESSURE: 79 MMHG | OXYGEN SATURATION: 93 % | TEMPERATURE: 97.34 F

## 2018-02-17 VITALS
DIASTOLIC BLOOD PRESSURE: 79 MMHG | TEMPERATURE: 97.34 F | SYSTOLIC BLOOD PRESSURE: 132 MMHG | OXYGEN SATURATION: 95 % | HEART RATE: 85 BPM

## 2018-02-17 VITALS — OXYGEN SATURATION: 93 %

## 2018-02-17 VITALS — OXYGEN SATURATION: 95 %

## 2018-02-17 RX ADMIN — HEPARIN SODIUM SCH UNIT: 10000 INJECTION, SOLUTION INTRAVENOUS; SUBCUTANEOUS at 06:10

## 2018-02-17 RX ADMIN — Medication SCH INTER.UNIT: at 09:30

## 2018-02-17 RX ADMIN — OXYCODONE HYDROCHLORIDE AND ACETAMINOPHEN SCH MG: 500 TABLET ORAL at 09:30

## 2018-02-17 RX ADMIN — AMLODIPINE BESYLATE SCH MG: 5 TABLET ORAL at 09:30

## 2018-02-17 RX ADMIN — LEVOTHYROXINE SODIUM SCH MCG: 88 TABLET ORAL at 06:07

## 2018-02-17 RX ADMIN — ACETAMINOPHEN SCH MLS/HR: 10 INJECTION, SOLUTION INTRAVENOUS at 03:06

## 2018-02-17 RX ADMIN — DOCUSATE SODIUM SCH MG: 100 CAPSULE, LIQUID FILLED ORAL at 09:28

## 2018-02-17 RX ADMIN — ALUMINUM ZIRCONIUM TRICHLOROHYDREX GLY SCH EA: 0.2 STICK TOPICAL at 08:00

## 2018-02-17 RX ADMIN — Medication SCH MCG: at 09:31

## 2018-02-17 RX ADMIN — ACETAMINOPHEN SCH MLS/HR: 10 INJECTION, SOLUTION INTRAVENOUS at 12:00

## 2018-02-17 RX ADMIN — CHOLESTYRAMINE SCH GM: 4 POWDER, FOR SUSPENSION ORAL at 10:00

## 2018-02-17 RX ADMIN — IPRATROPIUM BROMIDE AND ALBUTEROL SCH PUFFS: 20; 100 SPRAY, METERED RESPIRATORY (INHALATION) at 09:28

## 2018-02-17 RX ADMIN — ALUMINUM ZIRCONIUM TRICHLOROHYDREX GLY SCH EA: 0.2 STICK TOPICAL at 00:00

## 2018-02-17 RX ADMIN — HYDROCHLOROTHIAZIDE SCH MG: 25 TABLET ORAL at 09:29

## 2018-02-17 RX ADMIN — POTASSIUM CHLORIDE SCH MEQ: 1500 TABLET, EXTENDED RELEASE ORAL at 09:00

## 2018-02-17 RX ADMIN — METOPROLOL TARTRATE SCH MG: 50 TABLET, FILM COATED ORAL at 09:30

## 2018-02-17 NOTE — PROGRESS NOTE
Subjective


Date of Service:


Feb 17, 2018.


Subjective


Pt evaluation today including:  conversation w/ patient, physical exam, lab 

review, review of studies, conversation w/ consultant, review of inpatient 

medication list


Saw/examined the patient in room 375


She's doing well, no breathing difficulties


some serosanguineous drainage from the chest tube removal site


No chest pain





Problem List


Medical Problems:


(1) Pneumothorax


Status: Acute  





(2) Respiratory failure with hypoxia


Status: Acute  





(3) SOB (shortness of breath)


Status: Acute  











Review of Systems


Constitutional:  No fever, No chills, No weakness


Respiratory:  No cough, No sputum, No wheezing, No shortness of breath, No 

dyspnea on exertion, No dyspnea at rest, No hemoptysis


Cardiac:  No chest pain





Medications





Current Inpatient Medications








 Medications


  (Trade)  Dose


 Ordered  Sig/Adali


 Route  Start Time


 Stop Time Status Last Admin


Dose Admin


 


 Amlodipine


 Besylate


  (Norvasc Tab)  5 mg  DAILY


 PO  2/9/18 09:00


 3/11/18 08:59  2/17/18 09:30


5 MG


 


 Cyanocobalamin


  (Vitamin B-12


 Tab)  1,000 mcg  DAILY


 PO  2/9/18 09:00


 3/11/18 08:59  2/17/18 09:31


1,000 MCG


 


 Folic Acid


  (Folvite Tab)  1 mg  HS


 PO  2/8/18 21:00


 3/10/18 20:59  2/16/18 20:37


1 MG


 


 Levothyroxine


 Sodium


  (Synthroid Tab)  88 mcg  DAILYBB


 PO  2/9/18 06:00


 3/11/18 06:59  2/17/18 06:07


88 MCG


 


 Metoprolol


 Tartrate


  (Lopressor Tab)  50 mg  BID


 PO  2/8/18 21:00


 3/10/18 20:59  2/17/18 09:30


50 MG


 


 Ascorbic Acid


  (Vitamin C Tab)  500 mg  DAILY


 PO  2/9/18 09:00


 3/11/18 08:59  2/17/18 09:30


500 MG


 


 Cholecalciferol


  (Vitamin D Tab)  2,000


 inter.unit  DAILY


 PO  2/9/18 09:00


 3/11/18 08:59  2/17/18 09:30


2,000 INTER.UNIT


 


 Cholestyramine


 Resin


  (Questran Powder


 Light)  4 gm  BID@1000,2200


 PO  2/8/18 22:00


 3/10/18 21:59  2/16/18 21:10


4 GM


 


 Hydrochlorothiazide


  (Hydrochlorothiazide


 Tab)  12.5 mg  DAILY


 PO  2/9/18 09:00


 3/11/18 08:59  2/17/18 09:29


12.5 MG


 


 Miscellaneous


 Information


  (Order Awaiting


 Action)  1 ea  QS


 N/A  2/9/18 00:00


 3/11/18 00:00   


 


 


 Potassium Chloride


  (Klor-Con Tab)  20 meq  QAM


 PO  2/9/18 09:00


 3/11/18 08:59  2/16/18 09:54


20 MEQ


 


 Oxycodone HCl


  (Roxicodone


 Immediate Rel Tab)  5 mg  Q6H  PRN


 PO  2/13/18 19:45


 2/27/18 19:44   


 


 


 Acetaminophen


 1000 mg/Empty Bag  100 ml @ 


 400 mls/hr  Q8H


 IV  2/13/18 20:00


 3/15/18 19:59  2/17/18 03:06


400 MLS/HR


 


 Ondansetron HCl


  (Zofran Inj)  4 mg  Q4H  PRN


 IV  2/13/18 19:45


 3/15/18 19:44   


 


 


 Docusate Sodium


  (coLACE CAP)  100 mg  BID


 PO  2/13/18 21:00


 3/15/18 20:59  2/17/18 09:28


100 MG


 


 Morphine Sulfate


  (MoRPHine


 SULFATE INJ)    Q1H  PRN


 IV  2/13/18 19:45


 2/27/18 19:44   


 


 


 Heparin Sodium


  (Porcine)


  (Heparin Sq 5000


 Unit/0.5ml)  5,000 unit  Q8


 SQ  2/15/18 06:00


 3/17/18 05:59  2/17/18 06:10


5,000 UNIT


 


 Albuterol/


 Ipratropium


  (Combivent


 Respimat Inh)  1 puffs  QID


 INH  2/15/18 17:00


 3/17/18 16:59  2/17/18 09:28


1 PUFFS











Objective


Vital Signs











  Date Time  Temp Pulse Resp B/P (MAP) Pulse Ox O2 Delivery O2 Flow Rate FiO2


 


2/17/18 09:41     95 Room Air  


 


2/17/18 08:02 36.3 85 18 132/79 (96) 93 Nasal Cannula 2.0 


 


2/17/18 07:30      Nasal Cannula 1.0 


 


2/17/18 06:10     93 Nasal Cannula 2.0 


 


2/16/18 23:25      Nasal Cannula 2.0 


 


2/16/18 23:05 36.1 70 16 123/70 (87) 96 Nasal Cannula 2.0 


 


2/16/18 20:34  82  120/73 (89)    


 


2/16/18 16:08 36.4 86 18 139/86 (103) 96 Nasal Cannula 2.0 


 


2/16/18 16:00      Nasal Cannula 2.0 











Physical Exam


General Appearance:  + cachetic, + thin


Respiratory/Chest:  no respiratory distress, no accessory muscle use, + 

decreased breath sounds (decreased breath sounds L>R)


Cardiovascular:  regular rate, rhythm, no edema, no murmur


Extremities:  normal inspection, no pedal edema


Neurologic/Psychiatric:  no motor/sensory deficits, alert, normal mood/affect





Assessment and Plan


This is an 83 year old female with a PMH of COPD, tobacco use disorder, iron 

deficiency anemia, B12 deficiency, hypothyroidism, HTN - presents with 

worsening shortness of breath, dyspnea on exertion and pleuritic chest pain and 

subsequently found to have a pneumothorax





Secondary Spontaneous Pneumothorax


2/17


plan to d/c home today


will add albuterol PRN for breathing


can d/c home with CXR in one week or so





2/16


appreciate CTS input


chest tube removed


CXR suggests improvement of pneumothorax, residual emphysema


will recheck CXR in AM


likely d/c in AM





2/15


doing well, chest tube in place


to be removed in AM, possible d/c home in 1-2 days


monitor for aspiration





2/14


appreciate CTS input


s/p surgical intervention (bleb resection) on 2/13


CXR on 2/14 suggests small apical pneumothorax


speech evaluation pending due to trouble swallowing





2/13


presented with a L sided pneumothorax, secondary to her COPD; about 20mm 

separation


She initially wanted conservative management approach to this; but due to 

worsening pneumothorax, she had a chest tube thoracostomy on 2/10


appreciate cardiothoracic surgery management - chest tube was then removed on 2/ 12


Unfortunately, the size of the pneumothorax worsened; on 2/13; CXR performed 

showing a 2.1 cm pleural separation


Plan for now is for possible talc pleurodesis; awaiting further input by 

cardiothoracic surgery





COPD/Tobacco use disorder


patient does not want to use nicotine patch at this time


does not use inhalers at home for maintenance


should have PFTs performed and possible addition of Spiriva vs. ICS/LABA


appreciate pulmonary input for further management


has completed course of steroids and antibiotics which are no longer needed at 

this time





Pulmonary Cachexia


appreciate nutritional consult; patient refusing boost or ensure at this time


she is agreeable to small snacks throughout the day which should help





Pulmonary Nodule


6-7 mm spiculated nodule noted on the R lower lobe


outpatient follow-up in 6 months with repeat chest CT





HTN


blood pressure slightly elevated this morning, though anxiety playing a part 

this morning


will monitor throughout the day


currently on Amlodipine 5mg, low dose HCTZ, and metoprolol for now





Iron Deficiency Anemia


Hgb is stable


has received Venofer in the past


outpatient hematology follow-up





Hypothyroidism


continue Synthroid





Hx. of Colon Cancer


chronic diarrhea; continue Questran





DVT ppx


SCDs





FULL CODE

## 2018-02-17 NOTE — SURGERY PROGRESS NOTE
DATE: 02/17/2018

 

SUBJECTIVE:  Ms. Vilchis was seen today on 02/17/2018.  We removed her chest

tube yesterday.   We checked her film this morning and quite frankly I am

pleased with it.  It really did not look like she really has a pneumothorax

at all.  I think these are postoperative changes.  She looks very good.  We

did a Two Step and she does not qualify for oxygen.  She is on room air with 95%

saturations.  Her pain is well controlled.  Her incisions are clean.  Her

final pathology did show bullous changes.  There is no evidence of

malignancy.  If she is discharged today, I would like to see her back in the

office in about a week with a chest x-ray.  We will arrange that.  She is

ready for discharge.

 

 

 

 

KIYA

## 2018-02-17 NOTE — DISCHARGE SUMMARY
Discharge Summary


Date of Service


Feb 17, 2018.





Discharge Summary


Admission Date:


Feb 8, 2018 at 15:34


Discharge Date:  Feb 17, 2018


Discharge Disposition:  Home with services


Principal Diagnosis:


L Pneumothorax


COPD





Medication Reconciliation


New Medications:  


Ipratropium-Albuterol (Combivent Respimat) 1 Aer Aer


1 PUFFS INH QID for 30 Days, #1 INHALER





 


Continued Medications:  


Amlodipine (Norvasc) 5 Mg Tab


5 MG PO DAILY





Ascorbic Acid (Ascorbic Acid) 1 Pow Pow


500 MG PO DAILY





Cholecalciferol (D3 2000) 2,000 Unit Tab


1 TAB PO DAILY





Cholestyramine Light (Prevalite) 4 Gm/Dose Pow


4 GM PO BID





Cyanocobalamin (Vitamin B-12) 1,000 Mcg Tab


1000 MCG PO DAILY





Folic Acid (Folic Acid) 1 Mg Tab


1 TAB PO HS





Hydrochlorothiazide (Hydrochlorothiazide) 12.5 Mg Tab


1 TAB PO DAILY





Levothyroxine Sodium (Levothyroxine Sodium) 88 Mcg Tab


1 TAB PO QAM





Metoprolol Tartrate (Lopressor) (Lopressor) 50 Mg Tab


50 MG PO BID





Potassium Bicarbonate (Potassium Bicarbonate) 1 Pow Pow


25 MEQ PO HS











Admission Information


HPI (per Admitting provider):


This is an 84yo F with a PMH of moderate COPD, HTN, tobacco use disorder, 

hypothyroidism and other medical problems listed below who presents with SOB 

beginning this morning. Patient states that 2 mornings ago, she woke up with 

severe pain behind her L shoulder blade that resolved spontaneously throughout 

the day. This morning, patient was getting ready for the day and became 

extremely SOB stating that she couldn't catch her breath and felt like she was 

suffocating. Felt her heart beating very rapidly and felt lightheaded. No chest 

pain. Called EMS and was found to be hypoxic in the 70s en route to the ED. 

Patient is a smoker and has >60 pack years. Does not use home O2 or inhalers. 

Lives alone and ambulates without assistive devices. Has a history of MAXIMINO but 

follows with hematology for IV iron injections. Received her last series 6 

months ago and is due back to clinic for a follow-up. Denies fever, chills, 

headache, visual changes, chest pain, abdominal pain, nausea, vomiting, LE 

swelling. Has chronic diarrhea s/p R hemicolectomy for colon cancer. H/o C diff.


Physical Exam (per Admitting):  


   General Appearance:  + mild distress, + pertinent finding (Cachectic, 

Breathing comfortably with non-rebreather. )


   Head:  normocephalic, atraumatic


   Eyes:  normal inspection, PERRL, sclerae normal


   ENT:  normal ENT inspection, hearing grossly normal, pharynx normal


   Neck:  supple, thyroid normal, trachea midline


   Respiratory/Chest:  chest non-tender, lungs clear, no respiratory distress, 

no accessory muscle use, + decreased breath sounds (2/2 shallow breaths ), + 

pertinent finding (Coarse breath sounds )


   Cardiovascular:  regular rate, rhythm, no murmur, normal peripheral pulses


   Abdomen/GI:  non tender, soft, no organomegaly


   Back:  normal inspection


   Extremities/Musculoskelatal:  normal inspection, no calf tenderness, no 

pedal edema


   Neurologic/Psych:  no motor/sensory deficits, alert, normal mood/affect, 

oriented x 3


   Skin:  normal color, warm/dry





Hospital Course


This is an 83 year old female with a PMH of COPD, tobacco use disorder, iron 

deficiency anemia, B12 deficiency, hypothyroidism, HTN - presents with 

worsening shortness of breath, dyspnea on exertion and pleuritic chest pain and 

subsequently found to have a pneumothorax





Secondary Spontaneous Pneumothorax


2/17


plan to d/c home today


will add albuterol PRN for breathing


can d/c home with CXR in one week or so





2/16


appreciate CTS input


chest tube removed


CXR suggests improvement of pneumothorax, residual emphysema


will recheck CXR in AM


likely d/c in AM





2/15


doing well, chest tube in place


to be removed in AM, possible d/c home in 1-2 days


monitor for aspiration





2/14


appreciate CTS input


s/p surgical intervention (bleb resection) on 2/13


CXR on 2/14 suggests small apical pneumothorax


speech evaluation pending due to trouble swallowing





2/13


presented with a L sided pneumothorax, secondary to her COPD; about 20mm 

separation


She initially wanted conservative management approach to this; but due to 

worsening pneumothorax, she had a chest tube thoracostomy on 2/10


appreciate cardiothoracic surgery management - chest tube was then removed on 2/ 12


Unfortunately, the size of the pneumothorax worsened; on 2/13; CXR performed 

showing a 2.1 cm pleural separation


Plan for now is for possible talc pleurodesis; awaiting further input by 

cardiothoracic surgery





COPD/Tobacco use disorder


patient does not want to use nicotine patch at this time


does not use inhalers at home for maintenance


should have PFTs performed and possible addition of Spiriva vs. ICS/LABA


appreciate pulmonary input for further management


has completed course of steroids and antibiotics which are no longer needed at 

this time





Pulmonary Cachexia


appreciate nutritional consult; patient refusing boost or ensure at this time


she is agreeable to small snacks throughout the day which should help





Pulmonary Nodule


6-7 mm spiculated nodule noted on the R lower lobe


outpatient follow-up in 6 months with repeat chest CT





HTN


blood pressure slightly elevated this morning, though anxiety playing a part 

this morning


will monitor throughout the day


currently on Amlodipine 5mg, low dose HCTZ, and metoprolol for now





Iron Deficiency Anemia


Hgb is stable


has received Venofer in the past


outpatient hematology follow-up





Hypothyroidism


continue Synthroid





Hx. of Colon Cancer


chronic diarrhea; continue Questran





DVT ppx


SCDs





FULL CODE


Total time spent on discharge = 45 minutes


This includes examination of the patient, discharge planning, medication 

reconciliation, and communication with other providers.





Discharge Instructions


Please follow-up with Dr. Cruz in [place] on Friday, February 23 at 11:05AM


* You will be prescribed Combivent, use one puff four times daily


* You will need a chest x-ray in around one week; can get this done with 

primary care on Friday


* You will need to follow-up with Dr. Thakur, you will get a phone call with 

a date/time


* You will be discharged with home health

## 2018-02-17 NOTE — DISCHARGE INSTRUCTIONS
Discharge Instructions


Date of Service


Feb 17, 2018.





Admission


Reason for Admission:  Copd, Pneumothorax, Respiratory Failure W/ Hypoxia





Discharge


Discharge Diagnosis / Problem:  Pneumothorax, COPD, Respiratory Failure





Discharge Goals


Goal(s):  Decrease discomfort, Improve function, Diagnostic testing, 

Therapeutic intervention





Activity Recommendations


Activity Limitations:  resume your previous activity





.





Instructions / Follow-Up


Instructions / Follow-Up


Please follow-up with Dr. Cruz in [place] on Friday, February 23 at 11:05AM


* You will be prescribed Combivent, use one puff four times daily


* You will need a chest x-ray in around one week; can get this done with 

primary care on Friday


* You will need to follow-up with Dr. Thakur, you will get a phone call with 

a date/time


* You will be discharged with home health





Current Hospital Diet


Patient's current hospital diet: Regular Diet





Discharge Diet


Recommended Diet:  Regular Diet





Procedures


Procedures Performed:  


Left Video Assisted Thoracoscopy with apical bleb resection, partial pleurectomy





Pending Studies


Studies pending at discharge:  no





Medical Emergencies








.


Who to Call and When:





Medical Emergencies:  If at any time you feel your situation is an emergency, 

please call 911 immediately.





.





Non-Emergent Contact


Non-Emergency issues call your:  Primary Care Provider





.


.








"Provider Documentation" section prepared by Joselo Dean.








.





VTE Core Measure


Inpt VTE Proph given/why not?:  SCD's

## 2018-02-17 NOTE — DIAGNOSTIC IMAGING REPORT
CHEST ONE VIEW PORTABLE



HISTORY: Follow-up  pneumothorax



COMPARISON: Chest 2/16/2018.



FINDINGS: Suture material within the left upper lobe persists. Small amount of

gas adjacent to the aortic knob consistent with a pneumothorax. This remains

unchanged. There is also a small left apical pleural reflection consistent with

a pneumothorax. Small amount of left chest wall subcutaneous emphysema persists.

Interstitial thickening at the right lung base persist. Emphysema. The heart is

stable in size.



IMPRESSION:

Small left pneumothorax as described above. Postoperative changes within the

left lung apex are again noted.







Electronically signed by:  Ugo Vu M.D.

2/17/2018 7:32 AM



Dictated Date/Time:  2/17/2018 7:30 AM

## 2018-08-10 ENCOUNTER — HOSPITAL ENCOUNTER (OUTPATIENT)
Dept: HOSPITAL 45 - C.LABPBG | Age: 83
Discharge: HOME | End: 2018-08-10
Attending: THORACIC SURGERY (CARDIOTHORACIC VASCULAR SURGERY)
Payer: MEDICARE

## 2018-08-10 DIAGNOSIS — Z01.818: Primary | ICD-10-CM

## 2018-08-10 DIAGNOSIS — R91.8: ICD-10-CM

## 2018-08-10 LAB
BASOPHILS # BLD: 0.06 K/UL (ref 0–0.2)
BASOPHILS NFR BLD: 1.2 %
BUN SERPL-MCNC: 16 MG/DL (ref 7–18)
CALCIUM SERPL-MCNC: 9.1 MG/DL (ref 8.5–10.1)
CO2 SERPL-SCNC: 32 MMOL/L (ref 21–32)
CREAT SERPL-MCNC: 0.79 MG/DL (ref 0.6–1.2)
EOS ABS #: 0.14 K/UL (ref 0–0.5)
EOSINOPHIL NFR BLD AUTO: 178 K/UL (ref 130–400)
GLUCOSE SERPL-MCNC: 75 MG/DL (ref 70–99)
HCT VFR BLD CALC: 42.7 % (ref 37–47)
HGB BLD-MCNC: 14.4 G/DL (ref 12–16)
IG#: 0.01 K/UL (ref 0–0.02)
IMM GRANULOCYTES NFR BLD AUTO: 21.1 %
LYMPHOCYTES # BLD: 1.03 K/UL (ref 1.2–3.4)
MCH RBC QN AUTO: 30.4 PG (ref 25–34)
MCHC RBC AUTO-ENTMCNC: 33.7 G/DL (ref 32–36)
MCV RBC AUTO: 90.1 FL (ref 80–100)
MONO ABS #: 0.55 K/UL (ref 0.11–0.59)
MONOCYTES NFR BLD: 11.3 %
NEUT ABS #: 3.08 K/UL (ref 1.4–6.5)
NEUTROPHILS # BLD AUTO: 2.9 %
NEUTROPHILS NFR BLD AUTO: 63.3 %
PMV BLD AUTO: 9.3 FL (ref 7.4–10.4)
POTASSIUM SERPL-SCNC: 3.5 MMOL/L (ref 3.5–5.1)
RED CELL DISTRIBUTION WIDTH CV: 13 % (ref 11.5–14.5)
RED CELL DISTRIBUTION WIDTH SD: 42.9 FL (ref 36.4–46.3)
SODIUM SERPL-SCNC: 133 MMOL/L (ref 136–145)
WBC # BLD AUTO: 4.87 K/UL (ref 4.8–10.8)

## 2018-08-17 ENCOUNTER — HOSPITAL ENCOUNTER (INPATIENT)
Dept: HOSPITAL 45 - C.ACU | Age: 83
LOS: 8 days | Discharge: HOME HEALTH | DRG: 167 | End: 2018-08-25
Attending: THORACIC SURGERY (CARDIOTHORACIC VASCULAR SURGERY) | Admitting: THORACIC SURGERY (CARDIOTHORACIC VASCULAR SURGERY)
Payer: MEDICARE

## 2018-08-17 VITALS
BODY MASS INDEX: 16.43 KG/M2 | WEIGHT: 96.21 LBS | HEIGHT: 64 IN | BODY MASS INDEX: 16.43 KG/M2 | BODY MASS INDEX: 16.43 KG/M2 | WEIGHT: 96.21 LBS | HEIGHT: 64 IN

## 2018-08-17 VITALS
DIASTOLIC BLOOD PRESSURE: 79 MMHG | TEMPERATURE: 97.52 F | OXYGEN SATURATION: 95 % | SYSTOLIC BLOOD PRESSURE: 158 MMHG | HEART RATE: 77 BPM

## 2018-08-17 VITALS
SYSTOLIC BLOOD PRESSURE: 146 MMHG | DIASTOLIC BLOOD PRESSURE: 75 MMHG | TEMPERATURE: 97.34 F | HEART RATE: 69 BPM | OXYGEN SATURATION: 93 %

## 2018-08-17 VITALS
TEMPERATURE: 98.06 F | SYSTOLIC BLOOD PRESSURE: 156 MMHG | HEART RATE: 69 BPM | OXYGEN SATURATION: 93 % | DIASTOLIC BLOOD PRESSURE: 85 MMHG

## 2018-08-17 VITALS
DIASTOLIC BLOOD PRESSURE: 68 MMHG | OXYGEN SATURATION: 93 % | TEMPERATURE: 98.24 F | HEART RATE: 70 BPM | SYSTOLIC BLOOD PRESSURE: 137 MMHG

## 2018-08-17 VITALS
TEMPERATURE: 97.52 F | DIASTOLIC BLOOD PRESSURE: 89 MMHG | OXYGEN SATURATION: 92 % | SYSTOLIC BLOOD PRESSURE: 171 MMHG | HEART RATE: 70 BPM

## 2018-08-17 VITALS
OXYGEN SATURATION: 94 % | TEMPERATURE: 98.06 F | DIASTOLIC BLOOD PRESSURE: 75 MMHG | HEART RATE: 70 BPM | SYSTOLIC BLOOD PRESSURE: 141 MMHG

## 2018-08-17 VITALS
TEMPERATURE: 97.52 F | SYSTOLIC BLOOD PRESSURE: 135 MMHG | HEART RATE: 75 BPM | DIASTOLIC BLOOD PRESSURE: 71 MMHG | OXYGEN SATURATION: 92 %

## 2018-08-17 VITALS — OXYGEN SATURATION: 96 % | HEART RATE: 69 BPM

## 2018-08-17 DIAGNOSIS — I10: ICD-10-CM

## 2018-08-17 DIAGNOSIS — M81.0: ICD-10-CM

## 2018-08-17 DIAGNOSIS — Z88.0: ICD-10-CM
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
## 2018-08-17 DIAGNOSIS — T81.82XA: ICD-10-CM

## 2018-08-17 DIAGNOSIS — Z79.899: ICD-10-CM

## 2018-08-17 DIAGNOSIS — J44.9: ICD-10-CM

## 2018-08-17 DIAGNOSIS — J95.812: ICD-10-CM
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
## 2018-08-17 DIAGNOSIS — Z51.81: ICD-10-CM

## 2018-08-17 DIAGNOSIS — Z87.891: ICD-10-CM

## 2018-08-17 DIAGNOSIS — E03.9: ICD-10-CM

## 2018-08-17 DIAGNOSIS — C34.31: Primary | ICD-10-CM

## 2018-08-17 DIAGNOSIS — H35.30: ICD-10-CM
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
## 2018-08-17 DIAGNOSIS — J95.811: ICD-10-CM
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
## 2018-08-17 DIAGNOSIS — Z88.8: ICD-10-CM

## 2018-08-17 PROCEDURE — 0BBF4ZX EXCISION OF RIGHT LOWER LUNG LOBE, PERCUTANEOUS ENDOSCOPIC APPROACH, DIAGNOSTIC: ICD-10-PCS | Performed by: THORACIC SURGERY (CARDIOTHORACIC VASCULAR SURGERY)

## 2018-08-17 PROCEDURE — 0W9930Z DRAINAGE OF RIGHT PLEURAL CAVITY WITH DRAINAGE DEVICE, PERCUTANEOUS APPROACH: ICD-10-PCS | Performed by: THORACIC SURGERY (CARDIOTHORACIC VASCULAR SURGERY)

## 2018-08-17 RX ADMIN — DEXTROSE AND SODIUM CHLORIDE SCH MLS/HR: 5; .45 INJECTION, SOLUTION INTRAVENOUS at 20:00

## 2018-08-17 RX ADMIN — ALUMINUM ZIRCONIUM TRICHLOROHYDREX GLY SCH EA: 0.2 STICK TOPICAL at 23:46

## 2018-08-17 RX ADMIN — OXYCODONE HYDROCHLORIDE AND ACETAMINOPHEN SCH MG: 500 TABLET ORAL at 20:54

## 2018-08-17 RX ADMIN — METOPROLOL TARTRATE SCH MG: 50 TABLET, FILM COATED ORAL at 20:54

## 2018-08-17 RX ADMIN — Medication SCH MCG: at 20:55

## 2018-08-17 NOTE — ANESTHESIOLOGY PROGRESS NOTE
Anesthesia Post Op Note


Date & Time


Aug 17, 2018 at 18:17





Vital Signs


Pain Intensity:  0





Vital Signs Past 12 Hours








  Date Time  Temp Pulse Resp B/P (MAP) Pulse Ox O2 Delivery O2 Flow Rate FiO2


 


8/17/18 18:11    159/83    


 


8/17/18 18:08  64 20  95   


 


8/17/18 18:08  64 20     


 


8/17/18 18:06    154/97    


 


8/17/18 18:03  63 17  94   


 


8/17/18 18:03  63 17     


 


8/17/18 18:01    163/81    


 


8/17/18 17:58  67 20     


 


8/17/18 17:58  74 20  95   


 


8/17/18 17:57  70 19     


 


8/17/18 17:57  68 19  94   


 


8/17/18 17:56    168/87    


 


8/17/18 17:52  68 17  94   


 


8/17/18 17:52  66 17     


 


8/17/18 17:51    159/83    


 


8/17/18 17:47  72 18 118/94 93   


 


8/17/18 17:47  71 18     


 


8/17/18 17:43    178/96    


 


8/17/18 17:42  69 21  92   


 


8/17/18 17:42  69 21     


 


8/17/18 17:37  74 18     


 


8/17/18 17:37  74 18 173/88 94   


 


8/17/18 17:33    164/105    


 


8/17/18 17:32  78 23     


 


8/17/18 17:32  138 23 166/113 99   


 


8/17/18 17:32 35.6 77 17 164/105 97 Oxymask 10 


 


8/17/18 14:54  69 16  96 Room Air  


 


8/17/18 09:38 36.4 70 20 171/89 92 Room Air  











Notes


Mental Status:  alert / awake / arousable, participated in evaluation


Pt Amnestic to Procedure:  Yes


Nausea / Vomiting:  adequately controlled


Pain:  adequately controlled


Airway Patency, RR, SpO2:  stable & adequate


BP & HR:  stable & adequate


Hydration State:  stable & adequate


Anesthetic Complications:  no major complications apparent

## 2018-08-17 NOTE — [facility] POST OPERATIVE BRIEF NOTE
Immediate Operative Summary


Operative Date


Aug 17, 2018.





Pre-Operative Diagnosis





Right lower lobe mass





Post-Operative Diagnosis





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





Procedure(s) Performed





Right Video Assisted Thoracic Surgery with Right Lower Wedge Resection





Surgeon


Dr. Thakur





Assistant Surgeon(s)


none





Estimated Blood Loss


10ml





Findings


Consistent with Post-Op Diagnosis





Specimens





Frozen section #1 right lower mass for margin/diagnosis sent 1550





Anesthesia Type


General

## 2018-08-17 NOTE — HISTORY AND PHYSICAL
History & Physical


Date


Aug 17, 2018.





Chief Complaint


"I'm here for my surgery."





History of Present Illness


The patient is a 83 year old female with complaints of an asymptomatic mass in 

her RLL. Worked her up for a clinical trial, but she did not qualify. Poor lung 

function. Here for a diagnostic and therapeutic wedge resection.





Past Medical/Surgical History


Medical Problems:


(1) COPD (chronic obstructive pulmonary disease)


(2) History of Clostridium difficile infection


(3) Hypertension


(4) Hypothyroidism


(5) MAXIMINO (iron deficiency anemia)


(6) Macular degeneration (senile) of retina, unspecified


(7) Osteoporosis


(8) Tobacco use disorder


(9) Venous insufficiency


Surgical Problems:


(1) H/O partial resection of colon








Additional History


Hepatic Disease:  No


Endocrine Disorder:  No


Kidney Disease:  No


Hypertension:  Yes


Heart Disease:  No


Bleeding Tendencies:  No


Infectious Diseases:  No


Other:


COPD





Allergies


Coded Allergies:  


     Penicillins (Verified  Allergy, Intermediate, RASH; HAS TOLERATED MEFOXIN, 

8/17/18)


     ACE Inhibitors (Verified  Adverse Reaction, Intermediate, SEVERE COUGH, 8/ 17/18)





Home Medications


Scheduled


Amlodipine (Norvasc), 5 MG PO QAM


Ascorbic Acid (Ascorbic Acid), 500 MG PO QPM


Cholecalciferol (Vitamin D3), 1 TAB PO QAM


Cyanocobalamin (Vitamin B-12), 1,000 MCG PO QPM


Folic Acid (Folic Acid), 1 TAB PO QPM


Hydrochlorothiazide (Hydrochlorothiazide), 1 TAB PO QAM


Levothyroxine Sodium (Levothyroxine Sodium), 1 TAB PO QAM


Metoprolol Tartrate (Lopressor) (Lopressor), 50 MG PO BID


Potassium Bicarbonate (Potassium Bicarbonate), 25 MEQ PO HS





Scheduled PRN


Cholestyramine (Prevalite), 1 DOSE PO BID PRN for Diarrhea


Ipratropium-Albuterol (Combivent Respimat), 1 PUFFS INH QID PRN for Shortness 

of Breath





Physical Examination


Skin:  warm/dry, no rash


Eyes:  normal inspection, EOMI, sclerae normal


ENT:  normal ENT inspection, pharynx normal


Head:  normocephalic, atraumatic


Neck:  supple, no adenopathy, trachea midline


Respiratory/Chest:  lungs clear, normal breath sounds, no respiratory distress, 

+ pertinent finding (Decreased breath sounds.)


Cardiovascular:  regular rate, rhythm, no edema, no murmur


Abdomen / GI:  normal bowel sounds, non tender


Extremities:  normal inspection, normal range of motion





Plan of Treatment


Right VATS with wedge resection.

## 2018-08-17 NOTE — DIAGNOSTIC IMAGING REPORT
CHEST ONE VIEW PORTABLE



CLINICAL HISTORY: s/p VATS    



COMPARISON STUDY:  Chest radiograph June 18, 2018.



FINDINGS: A right apical chest tube is in place. A large right pneumothorax is

noted. Pneumothorax likely occupies at least 50% of the right hemithorax.

Subcutaneous gas within the right chest wall is noted. There is no left

pneumothorax. Postoperative findings within the left lung are noted. Right lower

lung opacity is noted. There is no evidence for pulmonary edema. Severe

underlying emphysema is noted.



IMPRESSION:  Large right pneumothorax with right apical chest tube in place. 









Electronically signed by:  David Holly M.D.

8/17/2018 6:17 PM



Dictated Date/Time:  8/17/2018 6:15 PM

## 2018-08-18 VITALS
DIASTOLIC BLOOD PRESSURE: 109 MMHG | TEMPERATURE: 98.06 F | OXYGEN SATURATION: 94 % | HEART RATE: 71 BPM | SYSTOLIC BLOOD PRESSURE: 180 MMHG

## 2018-08-18 VITALS — OXYGEN SATURATION: 94 %

## 2018-08-18 VITALS
DIASTOLIC BLOOD PRESSURE: 74 MMHG | SYSTOLIC BLOOD PRESSURE: 135 MMHG | TEMPERATURE: 98.42 F | HEART RATE: 78 BPM | OXYGEN SATURATION: 95 %

## 2018-08-18 VITALS
DIASTOLIC BLOOD PRESSURE: 73 MMHG | TEMPERATURE: 98.24 F | OXYGEN SATURATION: 94 % | HEART RATE: 66 BPM | SYSTOLIC BLOOD PRESSURE: 140 MMHG

## 2018-08-18 VITALS
DIASTOLIC BLOOD PRESSURE: 73 MMHG | TEMPERATURE: 97.7 F | OXYGEN SATURATION: 94 % | SYSTOLIC BLOOD PRESSURE: 146 MMHG | HEART RATE: 69 BPM

## 2018-08-18 VITALS — OXYGEN SATURATION: 91 %

## 2018-08-18 VITALS
TEMPERATURE: 97.7 F | HEART RATE: 69 BPM | OXYGEN SATURATION: 93 % | DIASTOLIC BLOOD PRESSURE: 82 MMHG | SYSTOLIC BLOOD PRESSURE: 169 MMHG

## 2018-08-18 VITALS — SYSTOLIC BLOOD PRESSURE: 125 MMHG | DIASTOLIC BLOOD PRESSURE: 77 MMHG | HEART RATE: 80 BPM

## 2018-08-18 VITALS — OXYGEN SATURATION: 80 %

## 2018-08-18 VITALS
TEMPERATURE: 98.42 F | DIASTOLIC BLOOD PRESSURE: 73 MMHG | HEART RATE: 69 BPM | OXYGEN SATURATION: 94 % | SYSTOLIC BLOOD PRESSURE: 144 MMHG

## 2018-08-18 RX ADMIN — DEXTROSE AND SODIUM CHLORIDE SCH MLS/HR: 5; .45 INJECTION, SOLUTION INTRAVENOUS at 21:15

## 2018-08-18 RX ADMIN — MORPHINE SULFATE PRN MG: 2 INJECTION, SOLUTION INTRAMUSCULAR; INTRAVENOUS at 11:53

## 2018-08-18 RX ADMIN — AMLODIPINE BESYLATE SCH MG: 5 TABLET ORAL at 09:17

## 2018-08-18 RX ADMIN — ALUMINUM ZIRCONIUM TRICHLOROHYDREX GLY SCH EA: 0.2 STICK TOPICAL at 23:18

## 2018-08-18 RX ADMIN — LEVOTHYROXINE SODIUM SCH MCG: 88 TABLET ORAL at 05:20

## 2018-08-18 RX ADMIN — METOPROLOL TARTRATE SCH MG: 50 TABLET, FILM COATED ORAL at 21:17

## 2018-08-18 RX ADMIN — METOPROLOL TARTRATE SCH MG: 50 TABLET, FILM COATED ORAL at 09:16

## 2018-08-18 RX ADMIN — Medication SCH MCG: at 21:16

## 2018-08-18 RX ADMIN — DEXTROSE AND SODIUM CHLORIDE SCH MLS/HR: 5; .45 INJECTION, SOLUTION INTRAVENOUS at 08:04

## 2018-08-18 RX ADMIN — HYDROCHLOROTHIAZIDE SCH MG: 25 TABLET ORAL at 09:16

## 2018-08-18 RX ADMIN — ALUMINUM ZIRCONIUM TRICHLOROHYDREX GLY SCH EA: 0.2 STICK TOPICAL at 16:00

## 2018-08-18 RX ADMIN — OXYCODONE HYDROCHLORIDE AND ACETAMINOPHEN SCH MG: 500 TABLET ORAL at 21:16

## 2018-08-18 RX ADMIN — ALUMINUM ZIRCONIUM TRICHLOROHYDREX GLY SCH EA: 0.2 STICK TOPICAL at 08:00

## 2018-08-18 RX ADMIN — MORPHINE SULFATE PRN MG: 2 INJECTION, SOLUTION INTRAMUSCULAR; INTRAVENOUS at 16:29

## 2018-08-18 NOTE — DIAGNOSTIC IMAGING REPORT
CHEST ONE VIEW PORTABLE



CLINICAL HISTORY: 83 years-old Female presenting with pneumothorax. 



TECHNIQUE: Portable upright AP view of the chest was obtained.



COMPARISON: 8/17/2018.



FINDINGS:

Large bore right pleural drain remains positioned at the right apex. Extensive

associated soft tissue emphysema along the right chest wall extending to the

base of the neck. Atherosclerosis of the aorta. Cardiac silhouette normal in

size. Interval decrease in the moderate right pneumothorax, which now has a

pleural separation of 42 mm, previously 66 mm. Slight improved aeration of the

right lung. Flattening of the diaphragms. Extensive postsurgical changes of both

lungs with multiple suture margins. No left pneumothorax. Suspected osteopenia. 



IMPRESSION:

1.  Interval decreased size of the now moderate right pneumothorax with the

right pleural drain in place.



2.  Postsurgical changes of the lungs.



3.  Suspected underlying emphysema.







Electronically signed by:  Sai Fraga M.D.

8/18/2018 7:59 AM



Dictated Date/Time:  8/18/2018 7:57 AM

## 2018-08-18 NOTE — SURGERY PROGRESS NOTE
DATE: 08/18/2018

 

Christelle looks great today.  She has been ambulating in the hallway.  She did

have some desaturation when ambulating, but this quickly rebounded.  She is

on a nasal cannula.  She still has a sizeable air leak, but it is smaller. 

Her x-ray shows a decrease in the size of her pneumothorax.  She does have

decreased breath sounds.  Her pain is well controlled.  All in all, I am

quite pleased with Christelle Vilchis.  We are going to continue our current therapy

and I am hopeful that this air leak continues to improve and her lung

completely expands.  She is postop day 1 status post a resection of a

nonsmall cell lung carcinoma.

## 2018-08-19 VITALS
OXYGEN SATURATION: 95 % | HEART RATE: 73 BPM | SYSTOLIC BLOOD PRESSURE: 119 MMHG | TEMPERATURE: 98.24 F | DIASTOLIC BLOOD PRESSURE: 69 MMHG

## 2018-08-19 VITALS
HEART RATE: 77 BPM | TEMPERATURE: 98.42 F | SYSTOLIC BLOOD PRESSURE: 167 MMHG | OXYGEN SATURATION: 96 % | DIASTOLIC BLOOD PRESSURE: 89 MMHG

## 2018-08-19 VITALS — OXYGEN SATURATION: 93 %

## 2018-08-19 VITALS
SYSTOLIC BLOOD PRESSURE: 125 MMHG | OXYGEN SATURATION: 90 % | DIASTOLIC BLOOD PRESSURE: 81 MMHG | TEMPERATURE: 98.6 F | HEART RATE: 70 BPM

## 2018-08-19 VITALS — SYSTOLIC BLOOD PRESSURE: 150 MMHG | HEART RATE: 86 BPM | DIASTOLIC BLOOD PRESSURE: 75 MMHG

## 2018-08-19 VITALS — OXYGEN SATURATION: 91 %

## 2018-08-19 VITALS
TEMPERATURE: 98.78 F | HEART RATE: 73 BPM | SYSTOLIC BLOOD PRESSURE: 146 MMHG | OXYGEN SATURATION: 90 % | DIASTOLIC BLOOD PRESSURE: 70 MMHG

## 2018-08-19 VITALS — OXYGEN SATURATION: 79 %

## 2018-08-19 VITALS — OXYGEN SATURATION: 95 % | SYSTOLIC BLOOD PRESSURE: 143 MMHG | DIASTOLIC BLOOD PRESSURE: 78 MMHG

## 2018-08-19 VITALS — OXYGEN SATURATION: 90 %

## 2018-08-19 VITALS — OXYGEN SATURATION: 96 %

## 2018-08-19 RX ADMIN — ALUMINUM ZIRCONIUM TRICHLOROHYDREX GLY SCH EA: 0.2 STICK TOPICAL at 15:52

## 2018-08-19 RX ADMIN — METOPROLOL TARTRATE SCH MG: 50 TABLET, FILM COATED ORAL at 09:18

## 2018-08-19 RX ADMIN — LEVOTHYROXINE SODIUM SCH MCG: 88 TABLET ORAL at 05:46

## 2018-08-19 RX ADMIN — ALUMINUM ZIRCONIUM TRICHLOROHYDREX GLY SCH EA: 0.2 STICK TOPICAL at 08:00

## 2018-08-19 RX ADMIN — HYDROCHLOROTHIAZIDE SCH MG: 25 TABLET ORAL at 09:18

## 2018-08-19 RX ADMIN — OXYCODONE HYDROCHLORIDE AND ACETAMINOPHEN SCH MG: 500 TABLET ORAL at 21:04

## 2018-08-19 RX ADMIN — DEXTROSE AND SODIUM CHLORIDE SCH MLS/HR: 5; .45 INJECTION, SOLUTION INTRAVENOUS at 09:16

## 2018-08-19 RX ADMIN — DEXTROSE AND SODIUM CHLORIDE SCH MLS/HR: 5; .45 INJECTION, SOLUTION INTRAVENOUS at 21:05

## 2018-08-19 RX ADMIN — Medication SCH MCG: at 21:00

## 2018-08-19 RX ADMIN — ALUMINUM ZIRCONIUM TRICHLOROHYDREX GLY SCH EA: 0.2 STICK TOPICAL at 23:26

## 2018-08-19 RX ADMIN — METOPROLOL TARTRATE SCH MG: 50 TABLET, FILM COATED ORAL at 21:04

## 2018-08-19 RX ADMIN — AMLODIPINE BESYLATE SCH MG: 5 TABLET ORAL at 09:19

## 2018-08-19 NOTE — DIAGNOSTIC IMAGING REPORT
CHEST ONE VIEW PORTABLE



CLINICAL HISTORY: Pneumothorax.    



COMPARISON STUDY:  Chest radiograph August 18, 2018.



FINDINGS: A moderate right pneumothorax has slightly decreased in size since

prior exam. Superior pleural separation measures 4.1 cm. A right apical chest

tube is in place. Subcutaneous gas within the right chest wall and neck is

noted. Right basilar opacity persists. There is emphysema. There are

postoperative findings within each lung. 



IMPRESSION:  



1. Slight decrease in size of a moderate right apical pneumothorax. Right chest

place.



2. Persistent subcutaneous gas within the right chest wall and neck.













Electronically signed by:  David Holly M.D.

8/19/2018 6:52 AM



Dictated Date/Time:  8/19/2018 6:50 AM

## 2018-08-19 NOTE — SURGERY PROGRESS NOTE
DATE: 08/19/2018

 

Ms. Vilchis is seen today 48 hours after her wedge resection for a non-small

cell lung carcinoma.  She had a large air leak in the operating room;

however, we were able to extubate her.  I really could not find a leak.  Her

x-ray yesterday had improved from her immediate postoperative film.  Her

pneumothorax was smaller.  I thought her air leak was also smaller, but we

kept her on waterseal.  She did have some increasing subcutaneous emphysema;

however, this morning, her air leak is definitely better.  I put her on 10 cm

of water suction.  Her air leak was not continuous.  She does have more

subcutaneous emphysema.  She is sitting up after eating breakfast.  She is

doing well on room air with a saturation of 90%.  I feel better

about this.  We will continue to have her ambulate.  I thought she looked

very good today.

 

 

 

 

KIYA

## 2018-08-20 VITALS
DIASTOLIC BLOOD PRESSURE: 72 MMHG | SYSTOLIC BLOOD PRESSURE: 160 MMHG | HEART RATE: 70 BPM | TEMPERATURE: 98.42 F | OXYGEN SATURATION: 96 %

## 2018-08-20 VITALS
SYSTOLIC BLOOD PRESSURE: 142 MMHG | TEMPERATURE: 98.42 F | HEART RATE: 71 BPM | DIASTOLIC BLOOD PRESSURE: 77 MMHG | OXYGEN SATURATION: 89 %

## 2018-08-20 VITALS
HEART RATE: 75 BPM | OXYGEN SATURATION: 97 % | SYSTOLIC BLOOD PRESSURE: 149 MMHG | TEMPERATURE: 98.06 F | DIASTOLIC BLOOD PRESSURE: 73 MMHG

## 2018-08-20 VITALS — DIASTOLIC BLOOD PRESSURE: 74 MMHG | SYSTOLIC BLOOD PRESSURE: 156 MMHG | HEART RATE: 75 BPM

## 2018-08-20 VITALS — OXYGEN SATURATION: 94 %

## 2018-08-20 RX ADMIN — ALUMINUM ZIRCONIUM TRICHLOROHYDREX GLY SCH EA: 0.2 STICK TOPICAL at 08:00

## 2018-08-20 RX ADMIN — AMLODIPINE BESYLATE SCH MG: 5 TABLET ORAL at 09:08

## 2018-08-20 RX ADMIN — DEXTROSE AND SODIUM CHLORIDE SCH MLS/HR: 5; .45 INJECTION, SOLUTION INTRAVENOUS at 09:06

## 2018-08-20 RX ADMIN — ALUMINUM ZIRCONIUM TRICHLOROHYDREX GLY SCH EA: 0.2 STICK TOPICAL at 23:31

## 2018-08-20 RX ADMIN — MORPHINE SULFATE PRN MG: 2 INJECTION, SOLUTION INTRAMUSCULAR; INTRAVENOUS at 01:23

## 2018-08-20 RX ADMIN — DEXTROSE AND SODIUM CHLORIDE SCH MLS/HR: 5; .45 INJECTION, SOLUTION INTRAVENOUS at 22:22

## 2018-08-20 RX ADMIN — METOPROLOL TARTRATE SCH MG: 50 TABLET, FILM COATED ORAL at 09:08

## 2018-08-20 RX ADMIN — OXYCODONE HYDROCHLORIDE AND ACETAMINOPHEN SCH MG: 500 TABLET ORAL at 20:50

## 2018-08-20 RX ADMIN — ALUMINUM ZIRCONIUM TRICHLOROHYDREX GLY SCH EA: 0.2 STICK TOPICAL at 15:23

## 2018-08-20 RX ADMIN — Medication SCH MCG: at 20:49

## 2018-08-20 RX ADMIN — MORPHINE SULFATE PRN MG: 2 INJECTION, SOLUTION INTRAMUSCULAR; INTRAVENOUS at 13:16

## 2018-08-20 RX ADMIN — METOPROLOL TARTRATE SCH MG: 50 TABLET, FILM COATED ORAL at 20:50

## 2018-08-20 RX ADMIN — LEVOTHYROXINE SODIUM SCH MCG: 88 TABLET ORAL at 05:28

## 2018-08-20 RX ADMIN — HYDROCHLOROTHIAZIDE SCH MG: 25 TABLET ORAL at 09:09

## 2018-08-20 NOTE — SURGERY PROGRESS NOTE
DATE: 08/20/2018

 

Ms. Vilchis was seen today.  Her x-ray looks better.  Pneumothorax is much

smaller.  Her air leak is smaller.  I put her on regular suction at 20 cm of

water.  She has very little drainage.  She still has subcutaneous emphysema,

but she is only on 1 liter of O2 with good saturations.  At this point, we

are going to continue our current therapy until her air leak resolves and we

can remove this and allow her to be discharged.  Pathology is still pending.

## 2018-08-20 NOTE — DIAGNOSTIC IMAGING REPORT
CHEST ONE VIEW PORTABLE



CLINICAL HISTORY: wedge resection  PNEUMOTHORAX



COMPARISON STUDY:  August 19, 2018



FINDINGS: There is severe pulmonary emphysema. There is a right-sided chest tube

present. There is an equivocal tiny right apical pneumothorax the pleural

separation of 8 mm. There is extensive right-sided subcutaneous emphysema. There

is no lobar consolidation.[ There are suture lines present within the left upper

lung zone. Basilar opacities are likely atelectatic



IMPRESSION: 

1. Extensive right-sided subcutaneous emphysema

2. The right-sided chest tube remains in position. There is a tiny residual 8mm

right apical pneumothorax







Electronically signed by:  Sidney Guevara M.D.

8/20/2018 9:03 AM



Dictated Date/Time:  8/20/2018 9:00 AM

## 2018-08-20 NOTE — ANESTHESIOLOGY PROGRESS NOTE
Anesthesia Post Op Note


Date & Time


Aug 20, 2018 at 07:49





Vital Signs


Pain Intensity:  9.0





Vital Signs Past 12 Hours








  Date Time  Temp Pulse Resp B/P (MAP) Pulse Ox O2 Delivery O2 Flow Rate FiO2


 


8/20/18 06:59 36.9 70 19 160/72 (101) 96 Nasal Cannula 2.0 


 


8/19/18 23:45      Nasal Cannula 2.0 


 


8/19/18 22:44 36.9 77 16 167/89 (115) 96 Room Air  


 


8/19/18 21:02  86  150/75 (100)    


 


8/19/18 20:22     93 Nasal Cannula 2.0 


 


8/19/18 20:18     79 Room Air  











Notes


Mental Status:  alert / awake / arousable, participated in evaluation


Pt Amnestic to Procedure:  Yes


Nausea / Vomiting:  adequately controlled


Pain:  adequately controlled


Airway Patency, RR, SpO2:  stable & adequate


BP & HR:  stable & adequate


Hydration State:  stable & adequate


Anesthetic Complications:  no major complications apparent

## 2018-08-21 VITALS
HEART RATE: 79 BPM | DIASTOLIC BLOOD PRESSURE: 85 MMHG | TEMPERATURE: 98.24 F | SYSTOLIC BLOOD PRESSURE: 154 MMHG | OXYGEN SATURATION: 84 %

## 2018-08-21 VITALS
TEMPERATURE: 98.6 F | HEART RATE: 77 BPM | SYSTOLIC BLOOD PRESSURE: 148 MMHG | DIASTOLIC BLOOD PRESSURE: 74 MMHG | OXYGEN SATURATION: 93 %

## 2018-08-21 VITALS
HEART RATE: 75 BPM | SYSTOLIC BLOOD PRESSURE: 154 MMHG | OXYGEN SATURATION: 96 % | TEMPERATURE: 98.78 F | DIASTOLIC BLOOD PRESSURE: 90 MMHG

## 2018-08-21 VITALS — SYSTOLIC BLOOD PRESSURE: 161 MMHG | OXYGEN SATURATION: 91 % | DIASTOLIC BLOOD PRESSURE: 85 MMHG | HEART RATE: 90 BPM

## 2018-08-21 VITALS — OXYGEN SATURATION: 88 %

## 2018-08-21 VITALS — OXYGEN SATURATION: 94 %

## 2018-08-21 VITALS — OXYGEN SATURATION: 93 %

## 2018-08-21 VITALS
DIASTOLIC BLOOD PRESSURE: 82 MMHG | TEMPERATURE: 98.96 F | SYSTOLIC BLOOD PRESSURE: 169 MMHG | HEART RATE: 81 BPM | OXYGEN SATURATION: 92 %

## 2018-08-21 RX ADMIN — METOPROLOL TARTRATE SCH MG: 50 TABLET, FILM COATED ORAL at 09:49

## 2018-08-21 RX ADMIN — MORPHINE SULFATE PRN MG: 2 INJECTION, SOLUTION INTRAMUSCULAR; INTRAVENOUS at 02:43

## 2018-08-21 RX ADMIN — OXYCODONE HYDROCHLORIDE AND ACETAMINOPHEN SCH MG: 500 TABLET ORAL at 20:54

## 2018-08-21 RX ADMIN — LEVOTHYROXINE SODIUM SCH MCG: 88 TABLET ORAL at 05:31

## 2018-08-21 RX ADMIN — DEXTROSE AND SODIUM CHLORIDE SCH MLS/HR: 5; .45 INJECTION, SOLUTION INTRAVENOUS at 10:59

## 2018-08-21 RX ADMIN — Medication SCH MCG: at 20:55

## 2018-08-21 RX ADMIN — METOPROLOL TARTRATE SCH MG: 50 TABLET, FILM COATED ORAL at 20:52

## 2018-08-21 RX ADMIN — CHOLESTYRAMINE PRN GM: 4 POWDER, FOR SUSPENSION ORAL at 20:58

## 2018-08-21 RX ADMIN — AMLODIPINE BESYLATE SCH MG: 5 TABLET ORAL at 09:49

## 2018-08-21 RX ADMIN — MORPHINE SULFATE PRN MG: 2 INJECTION, SOLUTION INTRAMUSCULAR; INTRAVENOUS at 23:20

## 2018-08-21 RX ADMIN — MORPHINE SULFATE PRN MG: 2 INJECTION, SOLUTION INTRAMUSCULAR; INTRAVENOUS at 10:00

## 2018-08-21 RX ADMIN — HYDROCHLOROTHIAZIDE SCH MG: 25 TABLET ORAL at 09:52

## 2018-08-21 NOTE — SURGERY PROGRESS NOTE
DATE: 08/21/2018

 

Christelle Vilchis was seen today on 08/21/2018.  She is on 1 L of O2 with 92%

saturations.  Her chest tube is a bit positional.  She still has an air leak

and has developed subcutaneous emphysema; however, her chest x-ray today

shows essentially complete expansion of her lung.  I am quite pleased with

that.  She does not appear to have a pleural effusion.  Her air leak is

persistent but small.  She has markedly emphysematous lungs.  Otherwise, she

is ambulating in the hallway and tolerating her diet.  I am quite pleased. 

Her final pathology is not out yet; however, it appears that we are indeed

dealing with a nonsmall cell lung carcinoma and have clear margins.

 

 

 

 

[facility]

## 2018-08-21 NOTE — CLINICAL DOCUMENTATION QUERY
**** CLINICAL DOCUMENTATION QUERY****



Dr. PIMENTEL, 



BMI: A significantly high (>40) or significantly low (<19) BMI will impact the severity of 
illness and risk of mortality of your patient. 

However, the physician must document a correlating diagnosis in the medical record.



In your clinical opinion is this patient:

    

                        ( X ) Underweight

                        (  ) Not Agree



                        (  ) Other explanation of clinical findings (No explanation is 
considered a No Response)

                        (  ) Unable to determine 

                        (  ) Need to Discuss (Phone CDS or qliq) (No discussion is 
considered a No Response)

                                             

The medical record reflects the following clinical findings, treatment, and risk factors.  
  



Clinical Indicators: 82 yo female presenting with RLL lung cancer. Review of dietician 
assessment indicates pt had reported that she has not had a good appetite for "quite a 
while."

Treatment: HS snack, monitor wts and I/O

Risk Factors:COPD, lung cancer, age



Please clarify and document your clinical opinion in the progress notes and discharge 
summary. Terms such as "probable", "suspected", "likely", "questionable", "possible", or 
"still to be ruled out" are acceptable. 



*****IF IN AGREEMENT, YOU MUST DOCUMENT ABOVE DIAGNOSTIC STATEMENT IN DAILY PROGRESS NOTES 
AND DISCHARGE SUMMARY. This document is not part of the patient's record.*****

Thank You, Janna Betts, [phone]

## 2018-08-21 NOTE — DIAGNOSTIC IMAGING REPORT
CHEST ONE VIEW PORTABLE



CLINICAL HISTORY: 83 years-old Female presenting with lung resection . 



TECHNIQUE: Portable upright AP view of the chest was obtained.



COMPARISON: 8/20/2018.



FINDINGS:

Large bore right pleural drain remains has been advanced superiorly since the

prior radiograph. Extensive associated soft tissue emphysema along the right

chest wall tracking into the base of the neck and anteriorly. Atherosclerosis of

aortic arch. Cardiac silhouette mildly enlarged. Lungs are hyperinflated.

Postsurgical changes of suture margins again noted in the left upper lung.

Persistent minimal hazy opacity at the right lung base. Improved aeration of the

left lung base. Trace right apical pneumothorax noted medially. Osteopenia. 



IMPRESSION:

1.  Improved aeration of the left lung base.



2.  Suspected minimal atelectasis or postsurgical change at the right lung base.



3.  Underlying emphysema.



4.  Superior advancement of the right pleural drain. Trace right apical

pneumothorax.



5.  Extensive chest wall subcutaneous emphysema.







Electronically signed by:  Sai Fraga M.D.

8/21/2018 12:25 PM



Dictated Date/Time:  8/21/2018 12:23 PM

## 2018-08-22 VITALS — SYSTOLIC BLOOD PRESSURE: 121 MMHG | DIASTOLIC BLOOD PRESSURE: 71 MMHG | HEART RATE: 96 BPM

## 2018-08-22 VITALS
TEMPERATURE: 98.42 F | DIASTOLIC BLOOD PRESSURE: 81 MMHG | HEART RATE: 78 BPM | OXYGEN SATURATION: 91 % | SYSTOLIC BLOOD PRESSURE: 144 MMHG

## 2018-08-22 VITALS
OXYGEN SATURATION: 95 % | DIASTOLIC BLOOD PRESSURE: 84 MMHG | HEART RATE: 74 BPM | TEMPERATURE: 98.42 F | SYSTOLIC BLOOD PRESSURE: 141 MMHG

## 2018-08-22 VITALS — OXYGEN SATURATION: 92 %

## 2018-08-22 VITALS
SYSTOLIC BLOOD PRESSURE: 167 MMHG | OXYGEN SATURATION: 95 % | HEART RATE: 76 BPM | DIASTOLIC BLOOD PRESSURE: 84 MMHG | TEMPERATURE: 98.24 F

## 2018-08-22 VITALS
OXYGEN SATURATION: 90 % | DIASTOLIC BLOOD PRESSURE: 90 MMHG | SYSTOLIC BLOOD PRESSURE: 176 MMHG | HEART RATE: 80 BPM | TEMPERATURE: 98.42 F

## 2018-08-22 VITALS — SYSTOLIC BLOOD PRESSURE: 156 MMHG | DIASTOLIC BLOOD PRESSURE: 75 MMHG

## 2018-08-22 VITALS — DIASTOLIC BLOOD PRESSURE: 83 MMHG | HEART RATE: 79 BPM | SYSTOLIC BLOOD PRESSURE: 179 MMHG

## 2018-08-22 VITALS — OXYGEN SATURATION: 91 %

## 2018-08-22 VITALS — OXYGEN SATURATION: 90 %

## 2018-08-22 VITALS — HEART RATE: 82 BPM | OXYGEN SATURATION: 91 %

## 2018-08-22 RX ADMIN — HYDROCHLOROTHIAZIDE SCH MG: 25 TABLET ORAL at 07:43

## 2018-08-22 RX ADMIN — METOPROLOL TARTRATE SCH MG: 50 TABLET, FILM COATED ORAL at 07:43

## 2018-08-22 RX ADMIN — Medication SCH MCG: at 20:23

## 2018-08-22 RX ADMIN — CHOLESTYRAMINE PRN GM: 4 POWDER, FOR SUSPENSION ORAL at 22:16

## 2018-08-22 RX ADMIN — METOPROLOL TARTRATE SCH MG: 50 TABLET, FILM COATED ORAL at 20:23

## 2018-08-22 RX ADMIN — OXYCODONE HYDROCHLORIDE AND ACETAMINOPHEN SCH MG: 500 TABLET ORAL at 20:23

## 2018-08-22 RX ADMIN — AMLODIPINE BESYLATE SCH MG: 5 TABLET ORAL at 07:43

## 2018-08-22 RX ADMIN — LEVOTHYROXINE SODIUM SCH MCG: 88 TABLET ORAL at 05:19

## 2018-08-22 RX ADMIN — MORPHINE SULFATE PRN MG: 2 INJECTION, SOLUTION INTRAMUSCULAR; INTRAVENOUS at 23:59

## 2018-08-22 NOTE — SURGERY PROGRESS NOTE
DATE: 08/22/2018

 

Ms. Vilchis was seen today on postoperative day #5.  We did a wedge resection

of a non-small cell lung carcinoma of right lower lobe and the patient has an

air leak.  Her air leak has gotten better and better.  Her chest tube does

not appear to be working well.  We did pull it back a few times and

repositioned it and did get an air leak and then it stopped.  At any rate,

her subcutaneous emphysema is stable; in fact, I think it is better now than

it was yesterday.  Her x-ray shows essentially full expansion of her lung. 

If she does not have an air leak tomorrow morning, we will pull her chest tube. 

Overall, though, I am quite pleased with her.  Other than the subcutaneous

emphysema, she has done quite well.  She is between 1 and 2 liters of O2 and

has done well with that.  She is ambulating in the hallway.  Her official

path is pending, but it does appear to be a non-small cell lung carcinoma and

the margin too appeared to be negative.  We will continue our current therapy

and hopefully get her chest tube out in the near future.

 

 

 

 

KIYA

## 2018-08-22 NOTE — DIAGNOSTIC IMAGING REPORT
CHEST ONE VIEW PORTABLE



CLINICAL HISTORY: Pneumothorax.    



COMPARISON STUDY:  Chest radiograph August 21, 2018.



FINDINGS: A right apical chest tube is in place. A small right basilar

pneumothorax is noted. There may be a trace lateral compartment as well.

Subcutaneous gas within the right chest wall and neck is noted. Postoperative

findings within each lung are noted. There is emphysema. Mild right lower lung

opacity is noted. This is unchanged. Cardiac size is normal. There is no

evidence for pulmonary edema. 



IMPRESSION:  



1. Small right basilar pneumothorax, slightly increased from previous. Right

chest tube in place. 



2. No change in minimal right basilar opacity.













Electronically signed by:  David Holly M.D.

8/22/2018 7:39 AM



Dictated Date/Time:  8/22/2018 7:34 AM

## 2018-08-23 VITALS — OXYGEN SATURATION: 81 %

## 2018-08-23 VITALS
TEMPERATURE: 98.6 F | DIASTOLIC BLOOD PRESSURE: 81 MMHG | SYSTOLIC BLOOD PRESSURE: 138 MMHG | HEART RATE: 81 BPM | OXYGEN SATURATION: 94 %

## 2018-08-23 VITALS
DIASTOLIC BLOOD PRESSURE: 81 MMHG | SYSTOLIC BLOOD PRESSURE: 155 MMHG | TEMPERATURE: 98.06 F | HEART RATE: 71 BPM | OXYGEN SATURATION: 93 %

## 2018-08-23 VITALS — HEART RATE: 86 BPM | DIASTOLIC BLOOD PRESSURE: 91 MMHG | SYSTOLIC BLOOD PRESSURE: 150 MMHG

## 2018-08-23 VITALS
OXYGEN SATURATION: 94 % | TEMPERATURE: 97.52 F | HEART RATE: 92 BPM | SYSTOLIC BLOOD PRESSURE: 144 MMHG | DIASTOLIC BLOOD PRESSURE: 91 MMHG

## 2018-08-23 VITALS — OXYGEN SATURATION: 87 %

## 2018-08-23 VITALS — OXYGEN SATURATION: 97 %

## 2018-08-23 VITALS — OXYGEN SATURATION: 90 %

## 2018-08-23 VITALS — OXYGEN SATURATION: 91 %

## 2018-08-23 VITALS — OXYGEN SATURATION: 95 %

## 2018-08-23 RX ADMIN — Medication SCH MCG: at 22:25

## 2018-08-23 RX ADMIN — METOPROLOL TARTRATE SCH MG: 50 TABLET, FILM COATED ORAL at 22:23

## 2018-08-23 RX ADMIN — METOPROLOL TARTRATE SCH MG: 50 TABLET, FILM COATED ORAL at 08:26

## 2018-08-23 RX ADMIN — HYDROCHLOROTHIAZIDE SCH MG: 25 TABLET ORAL at 08:25

## 2018-08-23 RX ADMIN — AMLODIPINE BESYLATE SCH MG: 5 TABLET ORAL at 08:25

## 2018-08-23 RX ADMIN — OXYCODONE HYDROCHLORIDE AND ACETAMINOPHEN SCH MG: 500 TABLET ORAL at 22:23

## 2018-08-23 RX ADMIN — LEVOTHYROXINE SODIUM SCH MCG: 88 TABLET ORAL at 05:27

## 2018-08-23 NOTE — DIAGNOSTIC IMAGING REPORT
CHEST ONE VIEW PORTABLE



CLINICAL HISTORY: chest tube clamped    



COMPARISON STUDY:  Chest radiograph August 23, 2018 at 7:08 AM.



FINDINGS: Right chest tube has been partially withdrawn. One sidehole is located

outside of the pleural space. A small right pneumothorax is noted, slightly

decreased in size since prior exam. Apical component measures 8 mm. Small right

basilar component is noted. Severe emphysema is noted. There are postoperative

findings within each lung. There are trace bilateral pleural effusions with mild

bibasilar opacities. Subcutaneous gas persists. 



IMPRESSION:  



Small right pneumothorax, slightly decreased in size since previous exam.

Partially withdrawn chest tube with one sidehole outside of the pleural space.







Electronically signed by:  David Holly M.D.

8/23/2018 12:37 PM



Dictated Date/Time:  8/23/2018 12:33 PM

## 2018-08-23 NOTE — DIAGNOSTIC IMAGING REPORT
CHEST ONE VIEW PORTABLE



CLINICAL HISTORY: tube removal     



COMPARISON STUDY:  Chest radiograph August 23, 2018 at 11:49 AM.



FINDINGS: Right chest tube has been removed. A small to moderate right

pneumothorax has increased in size. Superior pleural separation measures 2.3 cm.

Subcutaneous trace gas within the right chest wall and neck is noted. There are

trace bilateral pleural effusions with mild right basilar opacity. Emphysema is

noted with postoperative findings within each lung. 



IMPRESSION:  Interval chest tube removal. Small to moderate right pneumothorax,

increased in size since prior exam. 









Electronically signed by:  David Holly M.D.

8/23/2018 12:39 PM



Dictated Date/Time:  8/23/2018 12:37 PM

## 2018-08-23 NOTE — DIAGNOSTIC IMAGING REPORT
CHEST ONE VIEW PORTABLE



CLINICAL HISTORY: Pneumothorax.    



COMPARISON STUDY:  Chest radiograph August 22, 2018.



FINDINGS: A right-sided chest tube has been partially withdrawn. A small right

pneumothorax has increased in size with superior pleural separation of 1.4 cm.

There are postoperative findings within each lung. Extensive subcutaneous gas

within the right chest and neck is noted. Right paratracheal opacity is noted as

well as right basilar opacity. Cardiac size is normal. 



IMPRESSION:  Partial withdrawal of the right chest tube. Increase in size of a

small right pneumothorax. Persistent subcutaneous gas. 









Electronically signed by:  David Holly M.D.

8/23/2018 7:37 AM



Dictated Date/Time:  8/23/2018 7:36 AM

## 2018-08-23 NOTE — SURGERY PROGRESS NOTE
DATE: 08/23/2018

 

Christelle hogan was seen this morning.  She is now 6 days status post a resection

of a nonsmall cell lung carcinoma locally.  Her lungs were such she would not

tolerate a lobectomy.  She is 93% on 2 L this morning.  Her chest tube is

really not draining any fluid and I do not see an air leak today.  Reviewing

her x-ray, it is difficult to say whether she has a pneumothorax, but her

subcutaneous emphysema is definitely less.  I am going to see how she looks

later on today.  I may pull this chest tube.  If she requires another one, we

will place it anteriorly, but I am hopeful that her air leak has sealed.

## 2018-08-24 VITALS
TEMPERATURE: 97.7 F | OXYGEN SATURATION: 95 % | DIASTOLIC BLOOD PRESSURE: 88 MMHG | HEART RATE: 86 BPM | SYSTOLIC BLOOD PRESSURE: 136 MMHG

## 2018-08-24 VITALS — OXYGEN SATURATION: 95 %

## 2018-08-24 VITALS
DIASTOLIC BLOOD PRESSURE: 88 MMHG | SYSTOLIC BLOOD PRESSURE: 152 MMHG | OXYGEN SATURATION: 92 % | TEMPERATURE: 98.24 F | HEART RATE: 79 BPM

## 2018-08-24 VITALS
HEART RATE: 78 BPM | TEMPERATURE: 98.96 F | SYSTOLIC BLOOD PRESSURE: 129 MMHG | DIASTOLIC BLOOD PRESSURE: 77 MMHG | OXYGEN SATURATION: 95 %

## 2018-08-24 VITALS
TEMPERATURE: 97.88 F | HEART RATE: 78 BPM | DIASTOLIC BLOOD PRESSURE: 88 MMHG | OXYGEN SATURATION: 96 % | SYSTOLIC BLOOD PRESSURE: 127 MMHG

## 2018-08-24 LAB
CREAT SERPL-MCNC: 0.59 MG/DL (ref 0.6–1.2)
EOSINOPHIL NFR BLD AUTO: 188 K/UL (ref 130–400)
HCT VFR BLD CALC: 39.7 % (ref 37–47)
HGB BLD-MCNC: 13.3 G/DL (ref 12–16)
INR PPP: 1 (ref 0.9–1.1)
MCH RBC QN AUTO: 30.3 PG (ref 25–34)
MCHC RBC AUTO-ENTMCNC: 33.5 G/DL (ref 32–36)
MCV RBC AUTO: 90.4 FL (ref 80–100)
PMV BLD AUTO: 9.3 FL (ref 7.4–10.4)
RED CELL DISTRIBUTION WIDTH CV: 13.2 % (ref 11.5–14.5)
RED CELL DISTRIBUTION WIDTH SD: 43.4 FL (ref 36.4–46.3)
WBC # BLD AUTO: 5.59 K/UL (ref 4.8–10.8)

## 2018-08-24 RX ADMIN — OXYCODONE HYDROCHLORIDE AND ACETAMINOPHEN SCH MG: 500 TABLET ORAL at 23:55

## 2018-08-24 RX ADMIN — Medication SCH MCG: at 23:55

## 2018-08-24 RX ADMIN — AMLODIPINE BESYLATE SCH MG: 5 TABLET ORAL at 08:35

## 2018-08-24 RX ADMIN — LEVOTHYROXINE SODIUM SCH MCG: 88 TABLET ORAL at 06:04

## 2018-08-24 RX ADMIN — HYDROCHLOROTHIAZIDE SCH MG: 25 TABLET ORAL at 08:36

## 2018-08-24 RX ADMIN — CHOLESTYRAMINE PRN GM: 4 POWDER, FOR SUSPENSION ORAL at 12:56

## 2018-08-24 RX ADMIN — METOPROLOL TARTRATE SCH MG: 50 TABLET, FILM COATED ORAL at 08:35

## 2018-08-24 RX ADMIN — METOPROLOL TARTRATE SCH MG: 50 TABLET, FILM COATED ORAL at 23:55

## 2018-08-24 RX ADMIN — HEPARIN SODIUM SCH UNIT: 10000 INJECTION, SOLUTION INTRAVENOUS; SUBCUTANEOUS at 15:32

## 2018-08-24 NOTE — SURGERY PROGRESS NOTE
Subjective


Date of Service:  Aug 24, 2018.


Pt. feels slight fatigue and a little SOB with activity.  No CP.  She is 

ambulating in hallway, voiding without difficult, and tolerating diet.





Objective


Vitals











  Date Time  Temp Pulse Resp B/P (MAP) Pulse Ox O2 Delivery O2 Flow Rate FiO2


 


8/24/18 08:46     95 Nasal Cannula 2.0 


 


8/24/18 08:02 36.5 86 24 136/88 (104) 95 Room Air  


 


8/24/18 07:25      Nasal Cannula 2.0 


 


8/24/18 02:45     95 Nasal Cannula 2.0 


 


8/23/18 23:24     97 Nasal Cannula 2.0 


 


8/23/18 22:47 36.4 92 16 144/91 (108) 94 Nasal Cannula 2.0 


 


8/23/18 22:21  86  150/91 (110)    


 


8/23/18 16:05     95 Nasal Cannula 2.0 


 


8/23/18 14:57 37.0 81 18 138/81 (100) 94 Nasal Cannula 2.0 











Physical Exam


General:  + well developed, + well nourished, 


   No distress


CV:  + RRR


Pulmonary:  + pertinent finding (BS are decreased on right side.), 


   No accessory muscle use, No respiratory distress


Extremities:  No calf tenderness


Neurologic:  + alert & oriented x 3





Radiology


CXR today showed improvement in right apical pneumothorax





Assessment & Plan


83 year old female with non-small cell lung cancer


-right lung wedge resection performed 





-chest tube removed on 8/23/18:


   -post-pull CXR showed right apical pneumothorax which has improved on serial 

CXR


   -pt. to stay inpt. with plans to repeat CXR in am 


   -will keep on 2 liters of oxygen due to pneumothorax 





-encourage coughing, deep breathing, and ambulation 





OTHER


-lovenox in place for DVT prevention

## 2018-08-24 NOTE — DIAGNOSTIC IMAGING REPORT
CHEST ONE VIEW PORTABLE



CLINICAL HISTORY: Pneumothorax.    



COMPARISON STUDY:  Chest radiograph August 23, 2018 12:27 PM.



FINDINGS: Postoperative findings within each lung are noted. A small right

pneumothorax has mildly decreased in size. The apical and basilar components

have decreased. Subcutaneous trace gas within the right chest wall and neck

persists. There is emphysema. Cardiac size is normal. There is no evidence for

pulmonary edema. Mild right lower lung opacity persists.



IMPRESSION:  



1. Slight decrease in size of a small right pneumothorax.



2. Persistent chest wall and neck subcutaneous gas.



3. Mild right lower lung opacity which is similar to previous exam. 









Electronically signed by:  David Holly M.D.

8/24/2018 7:42 AM



Dictated Date/Time:  8/24/2018 7:37 AM

## 2018-08-25 VITALS
SYSTOLIC BLOOD PRESSURE: 110 MMHG | HEART RATE: 88 BPM | OXYGEN SATURATION: 96 % | DIASTOLIC BLOOD PRESSURE: 90 MMHG | TEMPERATURE: 97.7 F

## 2018-08-25 VITALS
SYSTOLIC BLOOD PRESSURE: 110 MMHG | OXYGEN SATURATION: 96 % | HEART RATE: 88 BPM | DIASTOLIC BLOOD PRESSURE: 90 MMHG | TEMPERATURE: 97.7 F

## 2018-08-25 VITALS
TEMPERATURE: 97.34 F | HEART RATE: 80 BPM | OXYGEN SATURATION: 93 % | SYSTOLIC BLOOD PRESSURE: 110 MMHG | DIASTOLIC BLOOD PRESSURE: 80 MMHG

## 2018-08-25 RX ADMIN — HYDROCHLOROTHIAZIDE SCH MG: 25 TABLET ORAL at 08:59

## 2018-08-25 RX ADMIN — LEVOTHYROXINE SODIUM SCH MCG: 88 TABLET ORAL at 05:59

## 2018-08-25 RX ADMIN — METOPROLOL TARTRATE SCH MG: 50 TABLET, FILM COATED ORAL at 08:59

## 2018-08-25 RX ADMIN — AMLODIPINE BESYLATE SCH MG: 5 TABLET ORAL at 08:58

## 2018-08-25 RX ADMIN — HEPARIN SODIUM SCH UNIT: 10000 INJECTION, SOLUTION INTRAVENOUS; SUBCUTANEOUS at 00:22

## 2018-08-25 RX ADMIN — HEPARIN SODIUM SCH UNIT: 10000 INJECTION, SOLUTION INTRAVENOUS; SUBCUTANEOUS at 09:35

## 2018-08-25 NOTE — DIAGNOSTIC IMAGING REPORT
CHEST ONE VIEW PORTABLE



CLINICAL HISTORY: pneumothorax    



COMPARISON STUDY:  August 24, 2018



FINDINGS: There is radiographic evidence of emphysema. There is extensive

right-sided subcutaneous emphysema. There is a right apical pneumothorax the

pleural separation of 15 mm. There is a small right pleural effusion. There is a

nonspecific 17 mm right infrahilar nodule.[ 



IMPRESSION: 

1. 15 mm right apical pneumothorax

2. Severe subcutaneous emphysema

3. Small right pleural effusion

4. Nonspecific 17 mm right infrahilar nodule







Electronically signed by:  Sidney Guevara M.D.

8/25/2018 7:17 AM



Dictated Date/Time:  8/25/2018 7:15 AM

## 2018-08-25 NOTE — DISCHARGE INSTRUCTIONS
Discharge Instructions


Date of Service


Aug 25, 2018.





Admission


Reason for Admission:  Right Lung Mass





Discharge


Discharge Diagnosis / Problem:  Lung Cancer





Discharge Goals


Goal(s):  Learn about illness





Activity Recommendations


Activity Limitations:  as noted below


Lifting Limitations:  none


1.  Do not drive until cleared to do so by Dr. Thakur


.





Instructions / Follow-Up


Instructions / Follow-Up


1.  Office appointment with Dr. Thakur in 1 week.  Office will call with date 

and time of appointment.  Go to hospital one hour before appointment to have a 

chest x-ray taken.





2.  You may remove remaining dressings on 8/26/18 and shower thereafter.  No 

tub baths.





Current Hospital Diet


Patient's current hospital diet: Regular Diet





Discharge Diet


Recommended Diet:  Regular Diet





Procedures


Procedures Performed:  


Right Video Assisted Thoracic Surgery with Right Lower Wedge Resection





Pending Studies


Studies pending at discharge:  no





Medical Emergencies








.


Who to Call and When:





Medical Emergencies:  If at any time you feel your situation is an emergency, 

please call 911 immediately.





.





Non-Emergent Contact


Non-Emergency issues call your:  Surgeon


Call Non-Emergent contact if:  you have a fever, your pain is not controlled, 

wound has increased drainage


.








"Provider Documentation" section prepared by Stephane Joseph.








.

## 2018-08-25 NOTE — DISCHARGE SUMMARY
DISCHARGE DIAGNOSES:

1.  Nonsmall cell lung carcinoma, right lower lobe.

2.  Prolonged air leak.

3.  Chronic obstructive pulmonary disease.

4.  Long history of cigarette smoking.

5.  History of bullous emphysema.

 

HOSPITAL COURSE:  Christelle Vilchis is an 83-year-old female I know well.  She had a

hypermetabolic nodule which was growing in her right lower lobe and on

08/17/2018, I did a thoracoscopic wedge resection.  This was a very simple

case.  However, she had a large air leak and I could not find the etiology. 

I finally closed her after looking for this leak for well over an hour.  She

had a pneumothorax initially but it got smaller and then her air leak got

smaller and smaller.  I finally pulled her chest tube 2 days ago.  She has a

small pneumothorax but her subcutaneous emphysema is decreasing.  She is on 2

L of O2.  I am going to send her home and will see her back in a few days. 

Her incisions are clean.  She was having very little pain and specifically

stated she wanted no narcotics to go home.  We will get her set up for a

return visit with an x-ray.  I have of course told her to call me should she

run into any problems.

 

Christelle Vilchis has terrible lung function.  She will not tolerate any further

surgery on her lungs.  She has a long smoking history with terrible bullous

emphysema.  Unfortunately, this nodule was not in an area that she was a

candidate for a segmentectomy.  We did do a mediastinoscopy as part of her

staging workup and all of her nodes were negative.  This is a stage I

carcinoma.  The patient is going to be relocating to [place], [place] and we will make sure that we sent all of her appropriate records

with her.  She needs to be followed by an oncologist and if she does have a

local recurrence that she may be a candidate for SBRT.

## 2018-08-28 ENCOUNTER — HOSPITAL ENCOUNTER (OUTPATIENT)
Dept: HOSPITAL 45 - C.ULTRBC | Age: 83
Discharge: HOME | End: 2018-08-28
Attending: THORACIC SURGERY (CARDIOTHORACIC VASCULAR SURGERY)
Payer: COMMERCIAL

## 2018-08-28 DIAGNOSIS — G89.18: Primary | ICD-10-CM

## 2018-08-28 DIAGNOSIS — R91.8: ICD-10-CM

## 2018-08-28 NOTE — DIAGNOSTIC IMAGING REPORT
VENOUS DOPPLER LWR EXT BILA



HISTORY:      LEG PAIN



COMPARISON STUDY:  None.



FINDINGS: There is normal compressibility, flow, and augmentation within the

bilateral lower extremity deep venous systems.



IMPRESSION:  

No DVT within the right or left lower extremity.









The above report was generated using voice recognition software.  It may contain

grammatical, syntax or spelling errors.







Electronically signed by:  Shahriar Gray M.D.

8/28/2018 1:40 PM



Dictated Date/Time:  8/28/2018 1:40 PM

## 2018-08-28 NOTE — DIAGNOSTIC IMAGING REPORT
CHEST 2 VIEWS ROUTINE



CLINICAL HISTORY: LUNG MASS    



COMPARISON STUDY:  8/25/2018



FINDINGS: Slight improvement compared to the prior study. Potential nodule hilar

density right base persists. There is small right effusion. Subcutaneous

emphysema over the right hemithorax a low right cervical region is slightly

improved. Small right apical pneumothorax has diminished in 14 to 11 mm in terms

of pleural separation.



Emphysematous change appears similar. 



IMPRESSION:  Mildly improved exam with a slight decrease in volume of a small

right apical pneumothorax. Slight improvement in subcutaneous emphysema over the

right hemithorax. Several lower right rib fractures improved in visibility

compared to the prior exam. 











The above report was generated using voice recognition software.  It may contain

grammatical, syntax or spelling errors.









Electronically signed by:  Shahriar Gray M.D.

8/28/2018 1:05 PM



Dictated Date/Time:  8/28/2018 12:59 PM

## 2018-08-29 NOTE — EDITING REQUIRED CODING QUERY
CODING QUERY



To promote full compliance with coding requirements relating to patient care, provider 
participation is requested in all cases of  uncertainty.  Please assist us with the 
question(s) below:



Coding Question(s):  Please clarify below, in your clinical opinion, regarding the Air 
Leak, Pneumothorax and Subcutaneous Emphysema.



(  ) These were likely postoperative complications



(  ) These were Not likely postoperative complications     







Physician's Response(s): This was an intraoperative complication.





Thank you  

Ignacia Lyn      







Principal Diagnosis: "_that condition established after study, to be chiefly responsible 
for occasioning the admission of the patient to the hospital for care."

Co-Existing Principal Diagnosis:  "_when two or more diagnoses equally meet the criteria 
for principal diagnosis as determined by the circumstances of admission, diagnostic work 
up, and/or therapy provided, and the Alphabetic Index, Tabular List, or another coding 
guideline does not provide sequencing direction, any one of the diagnoses may be sequenced 
first."

"When the physician has documented what appears to be a current diagnosis in the body of 
the record, but has not included the diagnosis in the final diagnostic statement, the 
physician should be asked whether the diagnosis should be added."

(Source [facility]oding [facility] 2 QTR90. p3-4)